# Patient Record
Sex: MALE | Race: BLACK OR AFRICAN AMERICAN | Employment: FULL TIME
[De-identification: names, ages, dates, MRNs, and addresses within clinical notes are randomized per-mention and may not be internally consistent; named-entity substitution may affect disease eponyms.]

---

## 2017-01-27 ENCOUNTER — SURGERY (OUTPATIENT)
Age: 52
End: 2017-01-27

## 2017-01-27 ENCOUNTER — ANESTHESIA EVENT (OUTPATIENT)
Dept: ENDOSCOPY | Age: 52
End: 2017-01-27
Payer: COMMERCIAL

## 2017-01-27 ENCOUNTER — ANESTHESIA (OUTPATIENT)
Dept: ENDOSCOPY | Age: 52
End: 2017-01-27
Payer: COMMERCIAL

## 2017-01-27 PROCEDURE — 74011250636 HC RX REV CODE- 250/636

## 2017-01-27 RX ORDER — PROPOFOL 10 MG/ML
INJECTION, EMULSION INTRAVENOUS AS NEEDED
Status: DISCONTINUED | OUTPATIENT
Start: 2017-01-27 | End: 2017-01-27 | Stop reason: HOSPADM

## 2017-01-27 RX ORDER — PROPOFOL 10 MG/ML
INJECTION, EMULSION INTRAVENOUS
Status: DISCONTINUED | OUTPATIENT
Start: 2017-01-27 | End: 2017-01-27 | Stop reason: HOSPADM

## 2017-01-27 RX ADMIN — PROPOFOL 50 MG: 10 INJECTION, EMULSION INTRAVENOUS at 09:58

## 2017-01-27 RX ADMIN — PROPOFOL 160 MCG/KG/MIN: 10 INJECTION, EMULSION INTRAVENOUS at 09:58

## 2017-01-27 NOTE — ANESTHESIA POSTPROCEDURE EVALUATION
Post-Anesthesia Evaluation and Assessment    Patient: Pia Vera MRN: [id number]  SSN: [id number]    YOB: 1965  Age: 49 y.o. Sex: male       Cardiovascular Function/Vital Signs  Visit Vitals    /87    Pulse (!) 51    Temp 36.1 °C (96.9 °F)    Resp 14    SpO2 99%       Patient is status post total IV anesthesia anesthesia for Procedure(s):  COLONOSCOPY/ 24.    Nausea/Vomiting: None    Postoperative hydration reviewed and adequate. Pain:  Pain Scale 1: Numeric (0 - 10) (01/27/17 0942)  Pain Intensity 1: 0 (01/27/17 0942)   Managed    Neurological Status: At baseline    Mental Status and Level of Consciousness: Arousable    Pulmonary Status:   O2 Device: Nasal cannula (01/27/17 1035)   Adequate oxygenation and airway patent    Complications related to anesthesia: None    Post-anesthesia assessment completed.  No concerns    Signed By: Evaristo Vale MD     January 27, 2017

## 2017-01-27 NOTE — ANESTHESIA PREPROCEDURE EVALUATION
Anesthetic History   No history of anesthetic complications            Review of Systems / Medical History  Patient summary reviewed, nursing notes reviewed and pertinent labs reviewed    Pulmonary  Within defined limits                 Neuro/Psych   Within defined limits           Cardiovascular    Hypertension: well controlled              Exercise tolerance: >4 METS     GI/Hepatic/Renal  Within defined limits              Endo/Other  Within defined limits           Other Findings              Physical Exam    Airway  Mallampati: II  TM Distance: > 6 cm  Neck ROM: normal range of motion   Mouth opening: Normal     Cardiovascular    Rhythm: regular  Rate: normal         Dental  No notable dental hx       Pulmonary  Breath sounds clear to auscultation               Abdominal         Other Findings            Anesthetic Plan    ASA: 2  Anesthesia type: total IV anesthesia            Anesthetic plan and risks discussed with: Patient

## 2018-04-12 ENCOUNTER — HOSPITAL ENCOUNTER (EMERGENCY)
Age: 53
Discharge: HOME | End: 2018-04-12
Attending: EMERGENCY MEDICINE
Payer: COMMERCIAL

## 2018-04-12 VITALS
BODY MASS INDEX: 25.77 KG/M2 | HEIGHT: 70 IN | HEART RATE: 82 BPM | OXYGEN SATURATION: 97 % | SYSTOLIC BLOOD PRESSURE: 159 MMHG | TEMPERATURE: 98.9 F | WEIGHT: 180 LBS | RESPIRATION RATE: 18 BRPM | DIASTOLIC BLOOD PRESSURE: 90 MMHG

## 2018-04-12 DIAGNOSIS — J30.89 SEASONAL ALLERGIC RHINITIS DUE TO OTHER ALLERGIC TRIGGER: ICD-10-CM

## 2018-04-12 DIAGNOSIS — R06.2 WHEEZING: Primary | ICD-10-CM

## 2018-04-12 PROCEDURE — 99283 EMERGENCY DEPT VISIT LOW MDM: CPT | Performed by: EMERGENCY MEDICINE

## 2018-04-12 PROCEDURE — 74011636637 HC RX REV CODE- 636/637: Performed by: EMERGENCY MEDICINE

## 2018-04-12 PROCEDURE — 94640 AIRWAY INHALATION TREATMENT: CPT

## 2018-04-12 PROCEDURE — 74011000250 HC RX REV CODE- 250: Performed by: EMERGENCY MEDICINE

## 2018-04-12 RX ORDER — IPRATROPIUM BROMIDE AND ALBUTEROL SULFATE 2.5; .5 MG/3ML; MG/3ML
3 SOLUTION RESPIRATORY (INHALATION)
Status: COMPLETED | OUTPATIENT
Start: 2018-04-12 | End: 2018-04-12

## 2018-04-12 RX ORDER — LORATADINE 10 MG/1
10 TABLET ORAL DAILY
Qty: 30 TAB | Refills: 0 | Status: SHIPPED | OUTPATIENT
Start: 2018-04-12 | End: 2018-05-12

## 2018-04-12 RX ORDER — ALBUTEROL SULFATE 90 UG/1
2 AEROSOL, METERED RESPIRATORY (INHALATION)
Qty: 1 INHALER | Refills: 0 | Status: SHIPPED | OUTPATIENT
Start: 2018-04-12 | End: 2020-04-01 | Stop reason: SDUPTHER

## 2018-04-12 RX ADMIN — IPRATROPIUM BROMIDE AND ALBUTEROL SULFATE 3 ML: .5; 3 SOLUTION RESPIRATORY (INHALATION) at 01:10

## 2018-04-12 RX ADMIN — PREDNISONE 60 MG: 10 TABLET ORAL at 00:59

## 2018-04-12 NOTE — DISCHARGE INSTRUCTIONS
Allergies: Care Instructions  Your Care Instructions    Allergies occur when your body's defense system (immune system) overreacts to certain substances. The immune system treats a harmless substance as if it were a harmful germ or virus. Many things can cause this overreaction, including pollens, medicine, food, dust, animal dander, and mold. Allergies can be mild or severe. Mild allergies can be managed with home treatment. But medicine may be needed to prevent problems. Managing your allergies is an important part of staying healthy. Your doctor may suggest that you have allergy testing to help find out what is causing your allergies. When you know what things trigger your symptoms, you can avoid them. This can prevent allergy symptoms and other health problems. For severe allergies that cause reactions that affect your whole body (anaphylactic reactions), your doctor may prescribe a shot of epinephrine to carry with you in case you have a severe reaction. Learn how to give yourself the shot and keep it with you at all times. Make sure it is not . Follow-up care is a key part of your treatment and safety. Be sure to make and go to all appointments, and call your doctor if you are having problems. It's also a good idea to know your test results and keep a list of the medicines you take. How can you care for yourself at home? · If you have been told by your doctor that dust or dust mites are causing your allergy, decrease the dust around your bed:  [facility] sheets, pillowcases, and other bedding in hot water every week. ¨ Use dust-proof covers for pillows, duvets, and mattresses. Avoid plastic covers because they tear easily and do not \"breathe. \" Wash as instructed on the label. ¨ Do not use any blankets and pillows that you do not need. ¨ Use blankets that you can wash in your washing machine. ¨ Consider removing drapes and carpets, which attract and hold dust, from your bedroom.   · If you are allergic to house dust and mites, do not use home humidifiers. Your doctor can suggest ways you can control dust and mites. · Look for signs of cockroaches. Cockroaches cause allergic reactions. Use cockroach baits to get rid of them. Then, clean your home well. Cockroaches like areas where grocery bags, newspapers, empty bottles, or cardboard boxes are stored. Do not keep these inside your home, and keep trash and food containers sealed. Seal off any spots where cockroaches might enter your home. · If you are allergic to mold, get rid of furniture, rugs, and drapes that smell musty. Check for mold in the bathroom. · If you are allergic to outdoor pollen or mold spores, use air-conditioning. Change or clean all filters every month. Keep windows closed. · If you are allergic to pollen, stay inside when pollen counts are high. Use a vacuum  with a HEPA filter or a double-thickness filter at least two times each week. · Stay inside when air pollution is bad. Avoid paint fumes, perfumes, and other strong odors. · Avoid conditions that make your allergies worse. Stay away from smoke. Do not smoke or let anyone else smoke in your house. Do not use fireplaces or wood-burning stoves. · If you are allergic to your pets, change the air filter in your furnace every month. Use high-efficiency filters. · If you are allergic to pet dander, keep pets outside or out of your bedroom. Old carpet and cloth furniture can hold a lot of animal dander. You may need to replace them. When should you call for help? Give an epinephrine shot if:  ? · You think you are having a severe allergic reaction. ? · You have symptoms in more than one body area, such as mild nausea and an itchy mouth. ? After giving an epinephrine shot call 911, even if you feel better. ?Call 911 if:  ? · You have symptoms of a severe allergic reaction. These may include:  ¨ Sudden raised, red areas (hives) all over your body.   ¨ Swelling of the throat, mouth, lips, or tongue. ¨ Trouble breathing. ¨ Passing out (losing consciousness). Or you may feel very lightheaded or suddenly feel weak, confused, or restless. ? · You have been given an epinephrine shot, even if you feel better. ?Call your doctor now or seek immediate medical care if:  ? · You have symptoms of an allergic reaction, such as:  ¨ A rash or hives (raised, red areas on the skin). ¨ Itching. ¨ Swelling. ¨ Belly pain, nausea, or vomiting. ? Watch closely for changes in your health, and be sure to contact your doctor if:  ? · You do not get better as expected. Where can you learn more? Go to http://christian-ray.info/. Enter L866 in the search box to learn more about \"Allergies: Care Instructions. \"  Current as of: September 29, 2016  Content Version: 11.4  © 2824-9455 [employer]. Care instructions adapted under license by [employer] (which disclaims liability or warranty for this information). If you have questions about a medical condition or this instruction, always ask your healthcare professional. [place] [address] any warranty or liability for your use of this information. Wheezing or Bronchoconstriction: Care Instructions  Your Care Instructions  Wheezing is a whistling noise made during breathing. It occurs when the small airways, or bronchial tubes, that lead to your lungs swell or contract (spasm) and become narrow. This narrowing is called bronchoconstriction. When your airways constrict, it is hard for air to pass through and this makes it hard for you to breathe. Wheezing and bronchoconstriction can be caused by many problems, including:  · An infection such as the flu or a cold. · Allergies such as hay fever. · Diseases such as asthma or chronic obstructive pulmonary disease. · Smoking. Treatment for your wheezing depends on what is causing the problem.  Your wheezing may get better without treatment. But you may need to pay attention to things that cause your wheezing and avoid them. Or you may need medicine to help treat the wheezing and to reduce the swelling or to relieve spasms in your lungs. Follow-up care is a key part of your treatment and safety. Be sure to make and go to all appointments, and call your doctor if you are having problems. It is also a good idea to know your test results and keep a list of the medicines you take. How can you care for yourself at home? · Take your medicine exactly as prescribed. Call your doctor if you think you are having a problem with your medicine. You will get more details on the specific medicine your doctor prescribes. · If your doctor prescribed antibiotics, take them as directed. Do not stop taking them just because you feel better. You need to take the full course of antibiotics. · Breathe moist air from a humidifier, hot shower, or sink filled with hot water. This may help ease your symptoms and make it easier for you to breathe. · If you have congestion in your nose and throat, drinking plenty of fluids, especially hot fluids, may help relieve your symptoms. If you have kidney, heart, or liver disease and have to limit fluids, talk with your doctor before you increase the amount of fluids you drink. · If you have mucus in your airways, it may help to breathe deeply and cough. · Do not smoke or allow others to smoke around you. Smoking can make your wheezing worse. If you need help quitting, talk to your doctor about stop-smoking programs and medicines. These can increase your chances of quitting for good. · Avoid things that may cause your wheezing. These may include colds, smoke, air pollution, dust, pollen, pets, cockroaches, stress, and cold air. When should you call for help? Call 911 anytime you think you may need emergency care. For example, call if:  ? · You have severe trouble breathing. ? · You passed out (lost consciousness). ?Call your doctor now or seek immediate medical care if:  ? · You cough up yellow, dark brown, or bloody mucus (sputum). ? · You have new or worse shortness of breath. ? · Your wheezing is not getting better or it gets worse after you start taking your medicine. ? Watch closely for changes in your health, and be sure to contact your doctor if:  ? · You do not get better as expected. Where can you learn more? Go to http://christian-ray.info/. Enter 454 0876 in the search box to learn more about \"Wheezing or Bronchoconstriction: Care Instructions. \"  Current as of: May 12, 2017  Content Version: 11.4  © 3050-2058 [employer]. Care instructions adapted under license by [employer] (which disclaims liability or warranty for this information). If you have questions about a medical condition or this instruction, always ask your healthcare professional. [address] any warranty or liability for your use of this information.

## 2018-04-12 NOTE — ED PROVIDER NOTES
HPI Comments: Patient presents to the ER complaining of wheezing and cough. Patient states symptoms started early this evening. Does report some rhinorrhea and nasal congestion. Denies any fevers or vomiting    Patient is a 47 y.o. male presenting with wheezing. The history is provided by the patient. Wheezing    This is a new problem. The current episode started 3 to 5 hours ago. The problem occurs constantly. The problem has not changed since onset. Associated symptoms include cough. Pertinent negatives include no abdominal pain, no coryza, no ear pain, no sore throat, no swollen glands and no rash. Past Medical History:   Diagnosis Date    Environmental and seasonal allergies 3/29/2016    Essential hypertension with goal blood pressure less than 140/90 9/30/2016       Past Surgical History:   Procedure Laterality Date    COLONOSCOPY N/A 1/27/2017    COLONOSCOPY/ 24 performed by Dom [facility] at [facility] END[facility]COPY    HX CYST REMOVAL Right     wrist    HX SHOULDER ARTHROSCOPY           SD COLONOSCOPY FLX DX W/COLLJ SPEC WHEN PFRMD  1/27/2017              Family History:   Problem Relation Age of Onset    Other Mother      hemorrhage    Other Father      alcoholism    Alcohol abuse Father     Colon Cancer Neg Hx        Social History     Social History    Marital status: SINGLE     Spouse name: N/A    Number of children: N/A    Years of education: N/A     Occupational History    Not on file. Social History Main Topics    Smoking status: Never Smoker    Smokeless tobacco: Never Used    Alcohol use 0.0 oz/week     0 Standard drinks or equivalent per week      Comment: social    Drug use: No    Sexual activity: Not on file     Other Topics Concern    Not on file     Social History Narrative    He is a  for his shift making auto parts for [employer]         ALLERGIES: Review of patient's allergies indicates no known allergies.     Review of Systems   Constitutional: Negative for diaphoresis and fatigue. HENT: Positive for congestion. Negative for ear pain and sore throat. Eyes: Negative for photophobia, redness and visual disturbance. Respiratory: Positive for cough and wheezing. Negative for choking and chest tightness. Cardiovascular: Negative for palpitations and leg swelling. Gastrointestinal: Negative for abdominal pain and anal bleeding. Endocrine: Negative for polydipsia and polyphagia. Genitourinary: Negative for flank pain and frequency. Musculoskeletal: Negative for back pain and gait problem. Skin: Negative for pallor and rash. Allergic/Immunologic: Negative for food allergies and immunocompromised state. Neurological: Negative for light-headedness and numbness. Hematological: Negative for adenopathy. Does not bruise/bleed easily. All other systems reviewed and are negative. Vitals:    04/12/18 0044   BP: (!) 164/98   Pulse: 97   Resp: 18   Temp: 98.9 °F (37.2 °C)   SpO2: 96%   Weight: 81.6 kg (180 lb)   Height: 5' 10\" (1.778 m)            Physical Exam   Constitutional: He appears well-developed and well-nourished. HENT:   Head: Normocephalic and atraumatic. Eyes: Conjunctivae and EOM are normal. Pupils are equal, round, and reactive to light. Cardiovascular: Normal rate, regular rhythm and intact distal pulses. Pulmonary/Chest: Effort normal. He has wheezes. Abdominal: Soft. Bowel sounds are normal.   Neurological: He is alert. He has normal reflexes. Nursing note and vitals reviewed. MDM  Number of Diagnoses or Management Options  Seasonal allergic rhinitis due to other allergic trigger:   Wheezing:   Diagnosis management comments: Patient with some minimal wheezing here. We'll treat with nebulizers    1:31 AM  Patient is clear on reassessment.   We'll discharge home    Risk of Complications, Morbidity, and/or Mortality  Presenting problems: low  Diagnostic procedures: low  Management options: low    Patient Progress  Patient progress: stable        ED Course       Procedures

## 2020-12-23 ENCOUNTER — HOSPITAL ENCOUNTER (OUTPATIENT)
Dept: LAB | Age: 55
Discharge: HOME | End: 2020-12-23

## 2020-12-23 PROCEDURE — 88305 TISSUE EXAM BY PATHOLOGIST: CPT

## 2021-02-01 ENCOUNTER — HOSPITAL ENCOUNTER (OUTPATIENT)
Dept: PHYSICAL THERAPY | Age: 56
Discharge: HOME | End: 2021-02-01
Attending: UROLOGY
Payer: COMMERCIAL

## 2021-02-01 ENCOUNTER — HOSPITAL ENCOUNTER (OUTPATIENT)
Dept: GENERAL RADIOLOGY | Age: 56
Discharge: HOME | End: 2021-02-01
Attending: UROLOGY
Payer: COMMERCIAL

## 2021-02-01 ENCOUNTER — HOSPITAL ENCOUNTER (OUTPATIENT)
Dept: SURGERY | Age: 56
Discharge: HOME | End: 2021-02-01
Payer: COMMERCIAL

## 2021-02-01 VITALS
OXYGEN SATURATION: 98 % | DIASTOLIC BLOOD PRESSURE: 86 MMHG | RESPIRATION RATE: 16 BRPM | WEIGHT: 166.8 LBS | BODY MASS INDEX: 23.88 KG/M2 | TEMPERATURE: 97.1 F | SYSTOLIC BLOOD PRESSURE: 158 MMHG | HEART RATE: 46 BPM | HEIGHT: 70 IN

## 2021-02-01 DIAGNOSIS — C61 MALIGNANT NEOPLASM OF PROSTATE (HCC): ICD-10-CM

## 2021-02-01 LAB
ALBUMIN SERPL-MCNC: 4.3 G/DL (ref 3.5–5)
ALBUMIN/GLOB SERPL: 1.1 {RATIO} (ref 1.2–3.5)
ALP SERPL-CCNC: 70 U/L (ref 50–136)
ALT SERPL-CCNC: 38 U/L (ref 12–65)
ANION GAP SERPL CALC-SCNC: 2 MMOL/L (ref 7–16)
APPEARANCE UR: CLEAR
AST SERPL-CCNC: 20 U/L (ref 15–37)
BILIRUB SERPL-MCNC: 0.7 MG/DL (ref 0.2–1.1)
BILIRUB UR QL: NEGATIVE
BUN SERPL-MCNC: 12 MG/DL (ref 6–23)
CALCIUM SERPL-MCNC: 9.1 MG/DL (ref 8.3–10.4)
CHLORIDE SERPL-SCNC: 106 MMOL/L (ref 98–107)
CO2 SERPL-SCNC: 32 MMOL/L (ref 21–32)
COLOR UR: YELLOW
CREAT SERPL-MCNC: 1.09 MG/DL (ref 0.8–1.5)
ERYTHROCYTE [DISTWIDTH] IN BLOOD BY AUTOMATED COUNT: 12.5 % (ref 11.9–14.6)
GLOBULIN SER CALC-MCNC: 4 G/DL (ref 2.3–3.5)
GLUCOSE SERPL-MCNC: 83 MG/DL (ref 65–100)
GLUCOSE UR STRIP.AUTO-MCNC: NEGATIVE MG/DL
HCT VFR BLD AUTO: 47.9 % (ref 41.1–50.3)
HGB BLD-MCNC: 16.1 G/DL (ref 13.6–17.2)
HGB UR QL STRIP: NEGATIVE
INR PPP: 1
KETONES UR QL STRIP.AUTO: NEGATIVE MG/DL
LEUKOCYTE ESTERASE UR QL STRIP.AUTO: NEGATIVE
MCH RBC QN AUTO: 31.8 PG (ref 26.1–32.9)
MCHC RBC AUTO-ENTMCNC: 33.6 G/DL (ref 31.4–35)
MCV RBC AUTO: 94.7 FL (ref 79.6–97.8)
NITRITE UR QL STRIP.AUTO: NEGATIVE
NRBC # BLD: 0 K/UL (ref 0–0.2)
PH UR STRIP: 6 [PH] (ref 5–9)
PLATELET # BLD AUTO: 201 K/UL (ref 150–450)
PMV BLD AUTO: 11.7 FL (ref 9.4–12.3)
POTASSIUM SERPL-SCNC: 3.4 MMOL/L (ref 3.5–5.1)
PROT SERPL-MCNC: 8.3 G/DL (ref 6.3–8.2)
PROT UR STRIP-MCNC: NEGATIVE MG/DL
PROTHROMBIN TIME: 13.5 SEC (ref 12.5–14.7)
RBC # BLD AUTO: 5.06 M/UL (ref 4.23–5.6)
SODIUM SERPL-SCNC: 140 MMOL/L (ref 138–145)
SP GR UR REFRACTOMETRY: 1.02 (ref 1–1.02)
UROBILINOGEN UR QL STRIP.AUTO: 1 EU/DL (ref 0.2–1)
WBC # BLD AUTO: 6.1 K/UL (ref 4.3–11.1)

## 2021-02-01 PROCEDURE — 87086 URINE CULTURE/COLONY COUNT: CPT

## 2021-02-01 PROCEDURE — 93005 ELECTROCARDIOGRAM TRACING: CPT | Performed by: UROLOGY

## 2021-02-01 PROCEDURE — 85610 PROTHROMBIN TIME: CPT

## 2021-02-01 PROCEDURE — 71046 X-RAY EXAM CHEST 2 VIEWS: CPT

## 2021-02-01 PROCEDURE — 97161 PT EVAL LOW COMPLEX 20 MIN: CPT

## 2021-02-01 PROCEDURE — 81003 URINALYSIS AUTO W/O SCOPE: CPT

## 2021-02-01 PROCEDURE — 85027 COMPLETE CBC AUTOMATED: CPT

## 2021-02-01 PROCEDURE — 97110 THERAPEUTIC EXERCISES: CPT

## 2021-02-01 PROCEDURE — 80053 COMPREHEN METABOLIC PANEL: CPT

## 2021-02-01 RX ORDER — FLUTICASONE PROPIONATE 50 MCG
2 SPRAY, SUSPENSION (ML) NASAL AS NEEDED
COMMUNITY
End: 2021-06-03

## 2021-02-01 NOTE — PROGRESS NOTES
Joelle Johnson  : 1965  Primary: [facility]  Secondary:  Therapy Center at [address] [place] [address], [address] [place] [address]  [phone]   [phone]         OUTPATIENT PHYSICAL THERAPY: Daily Treatment Note 2021     ICD-10: Treatment Diagnosis: R27.8 Lack of coordination (muscle incoordination)  Precautions/Allergies:   Patient has no known allergies. TREATMENT PLAN:  Effective Dates: 2021 TO 2021 (90 days). Frequency/Duration: Follow up 3 weeks post-op, then resume PT 1 x weekly. Duration to be determined post-operatively. MEDICAL/REFERRING DIAGNOSIS:  Malignant neoplasm of prostate ([address] Riaz Zimmerman   DATE OF ONSET: -  REFERRING PHYSICIAN: Yodit Blevins DO MD Orders: evaluate and treat  Return MD Appointment: 21       Pre-treatment Symptoms/Complaints:  see evaluation  Pain: Initial:    Post Session:  0/10   Medications Last Reviewed:  2021  Updated Objective Findings:  See evaluation note from today   TREATMENT:   THERAPEUTIC EXERCISE: (10 minutes):  Exercises per grid below to improve mobility, strength, and coordination. Required minimal visual, verbal, and manual cues to promote proper body alignment, promote proper body posture, promote proper body mechanics, and promote proper body breathing techniques. Progressed resistance, range, repetitions, and complexity of movement as indicated. Date:  21 Date:   Date:     Activity/Exercise Parameters Parameters Parameters   Kegel Exercises Supine, seated, standing      Bladder/bowel health Bladder health, toilet positioning      HEP                                Treatment/Session Summary:    · Response to Treatment:  Pt reports good understanding of plan of care, as well as prescribed home exercise program.  All questions were answered to pt's satisfaction. Pt was invited to call with any further questions or concerns.   · Communication/Consultation:  None today  · Equipment provided today:  None today  · Recommendations/Intent for next treatment session: Next visit will focus on update subjective, biofeedback, kegel + movement.   Total Treatment Billable Duration:  10 minutes  PT Patient Time In/Time Out  Time In: 1100  Time Out: 210 Cesar Austin DPT    Future Appointments   Date Time Provider Kin Hugo   2/1/2021  1:10 PM [address]IVE THRU [facility]SA IMD IMD   2/1/2021  2:45 PM Mayank Ramirez, DO [id number] PGU   2/1/2021  3:30 PM SFD ASSESSMENT RM 04 [id number] SFD OR PRE A   2/15/2021  3:00 PM Mayank Ramirez, DO [id number] PGU   3/1/2021  9:00 AM ANNETTE LaraT [facility]   3/3/2021  9:20 AM Bhavesh Pool MD [facility]   3/8/2021  9:00 AM Billy Sales DPT [facility]   3/15/2021  9:00 AM ANNETTE LaraT [facility]   3/22/2021  9:00 AM Billy Sales DPT [facility]   3/29/2021  9:00 AM ANNETTE LaraT [facility]   4/5/2021  9:00 AM Billy Sales, ANNETTET [facility]   5/27/2021 12:00 PM [id number] BLOOD DRAW [id number] PGU   6/3/2021 11:15 AM Faby Kothari MD [id number] PGU

## 2021-02-01 NOTE — PERIOP NOTES
Patient verified name and     Order for consent  found in EHR and matches case posting; patient verified. Type 3 surgery, Walk in assessment complete. Labs per surgeon: CBC,CMP, UA, UA Cx, PA/LAT,PT,EKG.; results pending. Labs per anesthesia protocol: no additional- T/S ordered DOS. ;  EKG: EKG 21    Dr Lucho Aleman reviewed EKG with no further orders. Patient COVID test date 2021; Patient did  show for the appointment. The testing center is located at the [address], [place]. If appointment is needed patient provided telephone number of [phone]. Hospital approved surgical skin cleanser and instructions given per hospital policy. Patient provided with and instructed on educational handouts including Guide to Surgery, Pain Management, Hand Hygiene, Blood Transfusion Education, and [facility] [place] [facility] Brochure. Patient answered medical/surgical history questions at their best of ability. All prior to admission medications documented in [facility]. Original medication prescription bottle were not visualized during patient appointment. Patient instructed to hold all vitamins 7 days prior to surgery and NSAIDS 5 days prior to surgery, patient verbalized understanding. Patient teach back successful and patient demonstrates knowledge of instructions. PLEASE CONTINUE TAKING ALL PRESCRIPTION MEDICATIONS UP TO THE DAY OF SURGERY UNLESS OTHERWISE DIRECTED BELOW. DISCONTINUE all vitamins and supplements 7 days prior to surgery. DISCONTINUE Non-Steriodal Anti-Inflammatory (NSAIDS) such as Advil, Ibuprofen, Excedrin, Motrin, and Aleve 5 days prior to surgery. Home Medications to take  the day of surgery      Levocetirizine (Xyzal)           Home Medications   to Hold           Comments     Bring Proair inhaler the day of surgery. Hibiclens shower the night before surgery and the morning of surgery.      *Visitor policy of 1 visitor per patient discussed. Please do not bring home medications with you on the day of surgery unless otherwise directed by your nurse. If you are instructed to bring home medications, please give them to your nurse as they will be administered by the nursing staff. If you have any questions, please call [facility] [phone] or [address] [place] [address] [phone]. A copy of this note was provided to the patient for reference.

## 2021-02-01 NOTE — THERAPY EVALUATION
Afua Lauren : 1965 Primary: [facility]uf Cramer* Secondary:  [facility] 
[address] [place] [address] [phone]   [phone] OUTPATIENT PHYSICAL THERAPY:Initial Assessment 2021 ICD-10: Treatment Diagnosis: R27.8 Lack of coordination (muscle incoordination) Precautions/Allergies:  
Patient has no known allergies. TREATMENT PLAN: 
Effective Dates: 2021 TO 2021 (90 days). Frequency/Duration: Follow up 3 weeks post-op, then resume PT 1 x weekly. Duration to be determined post-operatively. MEDICAL/REFERRING DIAGNOSIS: 
Malignant neoplasm of prostate ([facility] [address] Yudi Stager DATE OF ONSET: - 
REFERRING PHYSICIAN: Kalen Ramirez DO 
MD Orders: evaluate and treat Return MD Appointment: 21 INITIAL ASSESSMENT:  Mr. Lashanda Pickens presents with decreased coordination, strength and endurance of pelvic floor muscle pre operatively. Today he was educated on bladder health, kegel exercises, pelvic floor anatomy and role of musculature and precautions with catheter post operatively. He required verbal cuing for proper activation and isolation of pelvic floor muscles. He was given kegel exercises to do at home prior to surgery and once catheter is removed. He was educated on pain after surgery and precautions with kegel exercises. He was able to demonstrate improved coordination by the end of the session today. He will continue to benefit from skilled physical therapy to address above mentioned deficits and restore normal PFM function post operatively to minimize urinary incontinence PROBLEM LIST (Impacting functional limitations): 1. Decreased Rice with Home Exercise Program 
2. Decreased coordination 3. Decreased strength of pelvic floor which limits bladder control INTERVENTIONS PLANNED: 
1. Neuromuscular re-education 2. Biofeedback as needed 3. HEP 4. Bladder retraining 5. Bladder education 6. Therapeutic Activites 7. Therapeutic Exercise/Strengthening GOALS: (Goals have been discussed and agreed upon with patient.) Short-Term Functional Goals: Time Frame: 4 weeks 1. Pt will demonstrate I with basic PFM HEP to improve awareness, coordination, and timing of PFM. 2. Pt will increase water intake by 16 oz and decrease irritant consumption by 8 oz to improve bladder health and decrease UI. Discharge Goals: Time Frame: 8 weeks 1. Pt will demonstrate 10 quick flicks of the pelvic floor muscle group, without compensation, to implement urge suppression appropriately with urgency of urination and decrease the number of pads used per day. 2. Pt will increase PFM endurance to 10 seconds without compensation to improve standing tolerance without UI to > 1 hour. 3. Pt will demonstrate I with advanced core stabilization and general mobility program to facilitate carry over and I management of symptoms. Outcome Measure: Tool used: Expanded Prostate Cancer Index Composite for Clinical Practice (EPIC-CP) Score:  Initial: ? Urinary Incontinence symptom score: 0/12 
? Urinary Irritation/Obstruction symptoms score: 0/12 ? Bowel symptoms score: 0/12 
? Sexual symptoms score: 0/12 
? Vitality/hormonal symptoms score: 0/12 
? Overall Prostate Cancer QOL score: 0/60 Most Recent: X (Date: -- ) Interpretation of Score: This survey asks questions concerning certain urinary, bowel and sexual symptoms. Each question is scored on a 0-4 scale, 4 representing the greatest disability. There are 5 sub-scores, each out of 12 and a total overall symptom score out of 60. The higher the score, suggests a higher disability. Medical Necessity:  
· Patient is expected to demonstrate progress in strength, coordination and functional technique to minimize UI following prostatectomy. Reason for Services/Other Comments: 
· Patient continues to require skilled intervention due to above mentioned deficits. Total Duration: PT Patient Time In/Time Out Time In: 1100 Time Out: 1200 Rehabilitation Potential For Stated Goals: Good Regarding Gabbie Hodgkins therapy, I certify that the treatment plan above will be carried out by a therapist or under their direction. Thank you for this referral, 
Ari Higuera DPT Referring Physician Signature: Abdirashid Rosenbaum DO              Date Ambulatory/Rehab Services H2 Model Falls Risk Assessment Risk Factors: 
     (1)  Gender [Male] Ability to Rise from Chair: 
     (0)  Ability to rise in a single movement Falls Prevention Plan: No modifications necessary Total: (5 or greater = High Risk): 1 ©2010 [facility] [place]. [place] States Patent #[id number]. Federal Law prohibits the replication, distribution or use without written permission from [facility] HISTORY:  
History of Present Injury/Illness (Reason for Referral): 
Pt is a 47 yo male referred to pelvic floor physical therapy (PFPT) 2/2 prostate cancer by Abdirashid Rosenbaum DO. Findings were found in routine screening. He will be undergoing RALP on 2-8-21. Patient reports currently no symptoms. Exercise cycling. Urinary: Frequency 5-8 x/day, 0 x/night. Positive for none. Negative for stress urinary incontinence, urge urinary incontinence, urinary urgency, urinary frequency, incomplete emptying, urinary hesitancy/intermittency, dysuria, hematuria, nocturia and nocturnal enuresis. Pt does not use pads for protection; 0 pads per day (PPD). Fluid intake: 1 bottle  water/day; bladder irritants include: coffee (2 cups), Bowel: Frequency twice a day. Positive for none. Negative for pain with bowel movement, pushing/straining with bowel movement, fecal incontinence, constipation and incomplete emptying. Pt does not use pads for protection; 0 pads per day (PPD). Use of stool softeners or laxatives?  NO 
 Sexual: Pt is sexually active with female partners. negative for dyspareunia. History of sexual abuse:  NO:  
       Pelvic Pain: none Past Medical History/Comorbidities:  
Mr. Leidy Gotti  has a past medical history of Environmental and seasonal allergies (3/29/2016) and Essential hypertension with goal blood pressure less than 140/90 (9/30/2016). He also has no past medical history of Adverse effect of anesthesia, Difficult intubation, Malignant hyperthermia due to anesthesia, Nausea & vomiting, or Pseudocholinesterase deficiency. Mr. Leidy Gotti  has a past surgical history that includes hx cyst removal (Right); hx shoulder arthroscopy; pr colonoscopy flx dx w/collj spec when pfrmd (1/27/2017); and colonoscopy (N/A, 1/27/2017). Social History/Living Environment:  
  live alone · Social activities restricted due to urinary incontinence? NO:  
Prior Level of Function/Work/Activity: 
Work- assembly tech High school  Personal Factors:   
      Sex:  male Age:  48 y.o. Current Medications:   
Current Outpatient Medications:  
  levocetirizine (XYZAL) 5 mg tablet, TAKE 1 TABLET BY MOUTH ONCE DAILY, Disp: , Rfl:  
  losartan (COZAAR) 50 mg tablet, TAKE 1 TABLET BY MOUTH EVERY DAY, Disp: 90 Tab, Rfl: 0 
  albuterol (PROVENTIL HFA, VENTOLIN HFA, PROAIR HFA) 90 mcg/actuation inhaler, Take 1 Puff by inhalation every six (6) hours as needed for Wheezing or Shortness of Breath., Disp: 1 Inhaler, Rfl: 0 
  fluticasone propionate (FLONASE) 50 mcg/actuation nasal spray, 1 Kohler by Both Nostrils route daily. PRN allergy symptoms, Disp: 1 Bottle, Rfl: 0 Date Last Reviewed:  2/1/2021 Number of Personal Factors/Comorbidities that affect the Plan of Care: 0: LOW COMPLEXITY EXAMINATION:  
External Observation: · Voluntary Contraction: present · Voluntary Relaxation: present Pelvic Floor Muscle (PFM) Assessment: external palpation ? Muscle volume: [] decreased     [x] WNL     [] Defect ? PFM tension: [] decreased     [] increased     [x] WNL Contraction ability (MMT performed via external palpation): 
Voluntary contraction: [] absent     [] weak     [x] moderate      [] strong Voluntary relaxation: [] absent     [] partial     [x] complete Overflow: [] absent     [x] min     [] mod     [] severe / Compensatory mm groups include abdominals Body Structures Involved: 1. Muscles Body Functions Affected: 1. Genitourinary 2. Neuromusculoskeletal 
3. Movement Related Activities and Participation Affected: 1. Self Care 2. Interpersonal Interactions and Relationships 3. Community, Social and [place] Number of elements that affect the Plan of Care: 1-2: LOW COMPLEXITY CLINICAL PRESENTATION:  
Presentation: Stable and uncomplicated: LOW COMPLEXITY CLINICAL DECISION MAKING:  
  
Use of outcome tool(s) and clinical judgement create a POC that gives a: Clear prediction of patient's progress: LOW COMPLEXITY

## 2021-02-02 LAB
ATRIAL RATE: 45 BPM
CALCULATED P AXIS, ECG09: 81 DEGREES
CALCULATED R AXIS, ECG10: 39 DEGREES
CALCULATED T AXIS, ECG11: 60 DEGREES
DIAGNOSIS, 93000: NORMAL
P-R INTERVAL, ECG05: 196 MS
Q-T INTERVAL, ECG07: 446 MS
QRS DURATION, ECG06: 78 MS
QTC CALCULATION (BEZET), ECG08: 385 MS
VENTRICULAR RATE, ECG03: 45 BPM

## 2021-02-02 NOTE — PERIOP NOTES
All labs reviewed and WNL. UA WNL. UA culture with no growth at this time. CXR WNL. Chart flagged to continue to monitor UA culture.

## 2021-02-04 LAB
BACTERIA SPEC CULT: NORMAL
SERVICE CMNT-IMP: NORMAL

## 2021-02-07 ENCOUNTER — ANESTHESIA EVENT (OUTPATIENT)
Dept: SURGERY | Age: 56
DRG: 708 | End: 2021-02-07
Payer: COMMERCIAL

## 2021-02-08 ENCOUNTER — ANESTHESIA (OUTPATIENT)
Dept: SURGERY | Age: 56
DRG: 708 | End: 2021-02-08
Payer: COMMERCIAL

## 2021-02-08 ENCOUNTER — HOSPITAL ENCOUNTER (INPATIENT)
Age: 56
LOS: 1 days | Discharge: HOME | DRG: 708 | End: 2021-02-09
Attending: UROLOGY | Admitting: UROLOGY
Payer: COMMERCIAL

## 2021-02-08 DIAGNOSIS — Z90.79 S/P PROSTATECTOMY: Primary | ICD-10-CM

## 2021-02-08 PROBLEM — C61 PROSTATE CANCER (HCC): Status: ACTIVE | Noted: 2021-02-08

## 2021-02-08 LAB
ABO + RH BLD: NORMAL
BLOOD GROUP ANTIBODIES SERPL: NORMAL
HCT VFR BLD AUTO: 43.1 % (ref 41.1–50.3)
HGB BLD-MCNC: 15 G/DL (ref 13.6–17.2)
SPECIMEN EXP DATE BLD: NORMAL

## 2021-02-08 PROCEDURE — 77030000038 HC TIP SCIS LAPSCP SURI -B: Performed by: UROLOGY

## 2021-02-08 PROCEDURE — 77030010507 HC ADH SKN DERMBND J&J -B: Performed by: UROLOGY

## 2021-02-08 PROCEDURE — 77030008606 HC TRCR ENDOSC KII AMR -B: Performed by: UROLOGY

## 2021-02-08 PROCEDURE — 74011000250 HC RX REV CODE- 250: Performed by: UROLOGY

## 2021-02-08 PROCEDURE — 77030008756 HC TU IRR SUC STRY -B: Performed by: UROLOGY

## 2021-02-08 PROCEDURE — 77030039425 HC BLD LARYNG TRULITE DISP TELE -A: Performed by: ANESTHESIOLOGY

## 2021-02-08 PROCEDURE — 77030007955 HC PCH ENDOSC SPEC J&J -B: Performed by: UROLOGY

## 2021-02-08 PROCEDURE — 76010000878 HC OR TIME 3 TO 3.5HR INTENSV - TIER 2: Performed by: UROLOGY

## 2021-02-08 PROCEDURE — 77030026438 HC STYL ET INTUB CARD -A: Performed by: ANESTHESIOLOGY

## 2021-02-08 PROCEDURE — 77030040831 HC BAG URINE DRNG MDII -A: Performed by: UROLOGY

## 2021-02-08 PROCEDURE — 74011250637 HC RX REV CODE- 250/637: Performed by: ANESTHESIOLOGY

## 2021-02-08 PROCEDURE — 88307 TISSUE EXAM BY PATHOLOGIST: CPT

## 2021-02-08 PROCEDURE — 65270000029 HC RM PRIVATE

## 2021-02-08 PROCEDURE — 85018 HEMOGLOBIN: CPT

## 2021-02-08 PROCEDURE — 77030008522 HC TBNG INSUF LAPRO STRY -B: Performed by: UROLOGY

## 2021-02-08 PROCEDURE — 76060000037 HC ANESTHESIA 3 TO 3.5 HR: Performed by: UROLOGY

## 2021-02-08 PROCEDURE — 77030008603 HC TRCR ENDOSC EPATH J&J -C: Performed by: UROLOGY

## 2021-02-08 PROCEDURE — 77030016151 HC PROTCTR LNS DFOG COVD -B: Performed by: UROLOGY

## 2021-02-08 PROCEDURE — 77030008771 HC TU NG SALEM SUMP -A: Performed by: ANESTHESIOLOGY

## 2021-02-08 PROCEDURE — 77030020703 HC SEAL CANN DISP INTU -B: Performed by: UROLOGY

## 2021-02-08 PROCEDURE — 2709999900 HC NON-CHARGEABLE SUPPLY

## 2021-02-08 PROCEDURE — 74011250636 HC RX REV CODE- 250/636: Performed by: ANESTHESIOLOGY

## 2021-02-08 PROCEDURE — 77030003575 HC NDL INSUF ENDOPTH J&J -B: Performed by: UROLOGY

## 2021-02-08 PROCEDURE — 0VT34ZZ RESECTION OF BILATERAL SEMINAL VESICLES, PERCUTANEOUS ENDOSCOPIC APPROACH: ICD-10-PCS | Performed by: UROLOGY

## 2021-02-08 PROCEDURE — 77030034696 HC CATH URETH FOL 2W BARD -A: Performed by: UROLOGY

## 2021-02-08 PROCEDURE — 2709999900 HC NON-CHARGEABLE SUPPLY: Performed by: UROLOGY

## 2021-02-08 PROCEDURE — 77030031139 HC SUT VCRL2 J&J -A: Performed by: UROLOGY

## 2021-02-08 PROCEDURE — 0VT04ZZ RESECTION OF PROSTATE, PERCUTANEOUS ENDOSCOPIC APPROACH: ICD-10-PCS | Performed by: UROLOGY

## 2021-02-08 PROCEDURE — 36415 COLL VENOUS BLD VENIPUNCTURE: CPT

## 2021-02-08 PROCEDURE — 77030012411 HC DRN WND CARD -A: Performed by: UROLOGY

## 2021-02-08 PROCEDURE — 8E0W4CZ ROBOTIC ASSISTED PROCEDURE OF TRUNK REGION, PERCUTANEOUS ENDOSCOPIC APPROACH: ICD-10-PCS | Performed by: UROLOGY

## 2021-02-08 PROCEDURE — 76210000006 HC OR PH I REC 0.5 TO 1 HR: Performed by: UROLOGY

## 2021-02-08 PROCEDURE — 77030040922 HC BLNKT HYPOTHRM STRY -A: Performed by: ANESTHESIOLOGY

## 2021-02-08 PROCEDURE — 77030002933 HC SUT MCRYL J&J -A: Performed by: UROLOGY

## 2021-02-08 PROCEDURE — 74011250637 HC RX REV CODE- 250/637: Performed by: UROLOGY

## 2021-02-08 PROCEDURE — 74011000258 HC RX REV CODE- 258: Performed by: UROLOGY

## 2021-02-08 PROCEDURE — 74011250636 HC RX REV CODE- 250/636: Performed by: NURSE ANESTHETIST, CERTIFIED REGISTERED

## 2021-02-08 PROCEDURE — 77030019908 HC STETH ESOPH SIMS -A: Performed by: ANESTHESIOLOGY

## 2021-02-08 PROCEDURE — 77030037171 HC CLP LAPRO ABS SGL COVD -B: Performed by: UROLOGY

## 2021-02-08 PROCEDURE — 74011250636 HC RX REV CODE- 250/636: Performed by: UROLOGY

## 2021-02-08 PROCEDURE — 77030035277 HC OBTRTR BLDELSS DISP INTU -B: Performed by: UROLOGY

## 2021-02-08 PROCEDURE — 77030040361 HC SLV COMPR DVT MDII -B: Performed by: UROLOGY

## 2021-02-08 PROCEDURE — 86901 BLOOD TYPING SEROLOGIC RH(D): CPT

## 2021-02-08 PROCEDURE — 74011000250 HC RX REV CODE- 250: Performed by: NURSE ANESTHETIST, CERTIFIED REGISTERED

## 2021-02-08 PROCEDURE — 0VTQ4ZZ RESECTION OF BILATERAL VAS DEFERENS, PERCUTANEOUS ENDOSCOPIC APPROACH: ICD-10-PCS | Performed by: UROLOGY

## 2021-02-08 PROCEDURE — 77030037088 HC TUBE ENDOTRACH ORAL NSL COVD-A: Performed by: ANESTHESIOLOGY

## 2021-02-08 PROCEDURE — 77030013567 HC DRN WND RESERV BARD -A: Performed by: UROLOGY

## 2021-02-08 PROCEDURE — 77030002916 HC SUT ETHLN J&J -A: Performed by: UROLOGY

## 2021-02-08 RX ORDER — OXYCODONE AND ACETAMINOPHEN 5; 325 MG/1; MG/1
1 TABLET ORAL AS NEEDED
Status: DISCONTINUED | OUTPATIENT
Start: 2021-02-08 | End: 2021-02-08 | Stop reason: HOSPADM

## 2021-02-08 RX ORDER — NEOSTIGMINE METHYLSULFATE 1 MG/ML
INJECTION, SOLUTION INTRAVENOUS AS NEEDED
Status: DISCONTINUED | OUTPATIENT
Start: 2021-02-08 | End: 2021-02-08 | Stop reason: HOSPADM

## 2021-02-08 RX ORDER — SODIUM CHLORIDE 0.9 % (FLUSH) 0.9 %
5-40 SYRINGE (ML) INJECTION EVERY 8 HOURS
Status: DISCONTINUED | OUTPATIENT
Start: 2021-02-08 | End: 2021-02-08 | Stop reason: HOSPADM

## 2021-02-08 RX ORDER — ACETAMINOPHEN 325 MG/1
650 TABLET ORAL
Status: DISCONTINUED | OUTPATIENT
Start: 2021-02-08 | End: 2021-02-09 | Stop reason: HOSPADM

## 2021-02-08 RX ORDER — NALOXONE HYDROCHLORIDE 0.4 MG/ML
0.2 INJECTION, SOLUTION INTRAMUSCULAR; INTRAVENOUS; SUBCUTANEOUS AS NEEDED
Status: DISCONTINUED | OUTPATIENT
Start: 2021-02-08 | End: 2021-02-08 | Stop reason: HOSPADM

## 2021-02-08 RX ORDER — SODIUM CHLORIDE, SODIUM LACTATE, POTASSIUM CHLORIDE, CALCIUM CHLORIDE 600; 310; 30; 20 MG/100ML; MG/100ML; MG/100ML; MG/100ML
25 INJECTION, SOLUTION INTRAVENOUS CONTINUOUS
Status: DISCONTINUED | OUTPATIENT
Start: 2021-02-08 | End: 2021-02-08 | Stop reason: HOSPADM

## 2021-02-08 RX ORDER — MIDAZOLAM HYDROCHLORIDE 1 MG/ML
INJECTION, SOLUTION INTRAMUSCULAR; INTRAVENOUS AS NEEDED
Status: DISCONTINUED | OUTPATIENT
Start: 2021-02-08 | End: 2021-02-08 | Stop reason: HOSPADM

## 2021-02-08 RX ORDER — SODIUM CHLORIDE 0.9 % (FLUSH) 0.9 %
5-40 SYRINGE (ML) INJECTION AS NEEDED
Status: DISCONTINUED | OUTPATIENT
Start: 2021-02-08 | End: 2021-02-08 | Stop reason: HOSPADM

## 2021-02-08 RX ORDER — DOCUSATE SODIUM 100 MG/1
100 CAPSULE, LIQUID FILLED ORAL 2 TIMES DAILY
Status: DISCONTINUED | OUTPATIENT
Start: 2021-02-08 | End: 2021-02-09 | Stop reason: HOSPADM

## 2021-02-08 RX ORDER — CEFAZOLIN SODIUM/WATER 2 G/20 ML
2 SYRINGE (ML) INTRAVENOUS
Status: COMPLETED | OUTPATIENT
Start: 2021-02-08 | End: 2021-02-08

## 2021-02-08 RX ORDER — ZOLPIDEM TARTRATE 5 MG/1
5 TABLET ORAL
Status: DISCONTINUED | OUTPATIENT
Start: 2021-02-08 | End: 2021-02-09 | Stop reason: HOSPADM

## 2021-02-08 RX ORDER — MIDAZOLAM HYDROCHLORIDE 1 MG/ML
2 INJECTION, SOLUTION INTRAMUSCULAR; INTRAVENOUS
Status: DISCONTINUED | OUTPATIENT
Start: 2021-02-08 | End: 2021-02-08 | Stop reason: HOSPADM

## 2021-02-08 RX ORDER — LIDOCAINE HYDROCHLORIDE 10 MG/ML
0.1 INJECTION INFILTRATION; PERINEURAL AS NEEDED
Status: DISCONTINUED | OUTPATIENT
Start: 2021-02-08 | End: 2021-02-08 | Stop reason: HOSPADM

## 2021-02-08 RX ORDER — HYDROCODONE BITARTRATE AND ACETAMINOPHEN 5; 325 MG/1; MG/1
1 TABLET ORAL
Status: DISCONTINUED | OUTPATIENT
Start: 2021-02-08 | End: 2021-02-09 | Stop reason: HOSPADM

## 2021-02-08 RX ORDER — ONDANSETRON 2 MG/ML
INJECTION INTRAMUSCULAR; INTRAVENOUS AS NEEDED
Status: DISCONTINUED | OUTPATIENT
Start: 2021-02-08 | End: 2021-02-08 | Stop reason: HOSPADM

## 2021-02-08 RX ORDER — SCOLOPAMINE TRANSDERMAL SYSTEM 1 MG/1
1 PATCH, EXTENDED RELEASE TRANSDERMAL ONCE
Status: DISCONTINUED | OUTPATIENT
Start: 2021-02-08 | End: 2021-02-09 | Stop reason: HOSPADM

## 2021-02-08 RX ORDER — FENTANYL CITRATE 50 UG/ML
INJECTION, SOLUTION INTRAMUSCULAR; INTRAVENOUS AS NEEDED
Status: DISCONTINUED | OUTPATIENT
Start: 2021-02-08 | End: 2021-02-08 | Stop reason: HOSPADM

## 2021-02-08 RX ORDER — HYDROMORPHONE HYDROCHLORIDE 2 MG/ML
0.5 INJECTION, SOLUTION INTRAMUSCULAR; INTRAVENOUS; SUBCUTANEOUS
Status: DISCONTINUED | OUTPATIENT
Start: 2021-02-08 | End: 2021-02-08 | Stop reason: HOSPADM

## 2021-02-08 RX ORDER — GLYCOPYRROLATE 0.2 MG/ML
INJECTION INTRAMUSCULAR; INTRAVENOUS AS NEEDED
Status: DISCONTINUED | OUTPATIENT
Start: 2021-02-08 | End: 2021-02-08 | Stop reason: HOSPADM

## 2021-02-08 RX ORDER — LORATADINE 10 MG/1
10 TABLET ORAL DAILY
Status: DISCONTINUED | OUTPATIENT
Start: 2021-02-09 | End: 2021-02-09 | Stop reason: HOSPADM

## 2021-02-08 RX ORDER — ROCURONIUM BROMIDE 10 MG/ML
INJECTION, SOLUTION INTRAVENOUS AS NEEDED
Status: DISCONTINUED | OUTPATIENT
Start: 2021-02-08 | End: 2021-02-08 | Stop reason: HOSPADM

## 2021-02-08 RX ORDER — BUPIVACAINE HYDROCHLORIDE 2.5 MG/ML
INJECTION, SOLUTION EPIDURAL; INFILTRATION; INTRACAUDAL AS NEEDED
Status: DISCONTINUED | OUTPATIENT
Start: 2021-02-08 | End: 2021-02-08 | Stop reason: HOSPADM

## 2021-02-08 RX ORDER — DEXTROSE MONOHYDRATE AND SODIUM CHLORIDE 5; .45 G/100ML; G/100ML
150 INJECTION, SOLUTION INTRAVENOUS CONTINUOUS
Status: DISCONTINUED | OUTPATIENT
Start: 2021-02-08 | End: 2021-02-09

## 2021-02-08 RX ORDER — OXYBUTYNIN CHLORIDE 5 MG/1
5 TABLET ORAL
Status: DISCONTINUED | OUTPATIENT
Start: 2021-02-08 | End: 2021-02-09 | Stop reason: HOSPADM

## 2021-02-08 RX ORDER — ONDANSETRON 2 MG/ML
4 INJECTION INTRAMUSCULAR; INTRAVENOUS
Status: DISCONTINUED | OUTPATIENT
Start: 2021-02-08 | End: 2021-02-09 | Stop reason: HOSPADM

## 2021-02-08 RX ORDER — DIPHENHYDRAMINE HYDROCHLORIDE 50 MG/ML
12.5 INJECTION, SOLUTION INTRAMUSCULAR; INTRAVENOUS
Status: DISCONTINUED | OUTPATIENT
Start: 2021-02-08 | End: 2021-02-09 | Stop reason: HOSPADM

## 2021-02-08 RX ORDER — ALBUTEROL SULFATE 0.83 MG/ML
2.5 SOLUTION RESPIRATORY (INHALATION)
Status: DISCONTINUED | OUTPATIENT
Start: 2021-02-08 | End: 2021-02-09 | Stop reason: HOSPADM

## 2021-02-08 RX ORDER — LIDOCAINE HYDROCHLORIDE 20 MG/ML
INJECTION, SOLUTION EPIDURAL; INFILTRATION; INTRACAUDAL; PERINEURAL AS NEEDED
Status: DISCONTINUED | OUTPATIENT
Start: 2021-02-08 | End: 2021-02-08 | Stop reason: HOSPADM

## 2021-02-08 RX ORDER — ACETAMINOPHEN 500 MG
1000 TABLET ORAL ONCE
Status: COMPLETED | OUTPATIENT
Start: 2021-02-08 | End: 2021-02-08

## 2021-02-08 RX ORDER — EPHEDRINE SULFATE/0.9% NACL/PF 50 MG/5 ML
SYRINGE (ML) INTRAVENOUS AS NEEDED
Status: DISCONTINUED | OUTPATIENT
Start: 2021-02-08 | End: 2021-02-08 | Stop reason: HOSPADM

## 2021-02-08 RX ORDER — ESMOLOL HYDROCHLORIDE 10 MG/ML
INJECTION INTRAVENOUS AS NEEDED
Status: DISCONTINUED | OUTPATIENT
Start: 2021-02-08 | End: 2021-02-08 | Stop reason: HOSPADM

## 2021-02-08 RX ORDER — PROPOFOL 10 MG/ML
INJECTION, EMULSION INTRAVENOUS AS NEEDED
Status: DISCONTINUED | OUTPATIENT
Start: 2021-02-08 | End: 2021-02-08 | Stop reason: HOSPADM

## 2021-02-08 RX ORDER — LOSARTAN POTASSIUM 50 MG/1
50 TABLET ORAL DAILY
Status: DISCONTINUED | OUTPATIENT
Start: 2021-02-08 | End: 2021-02-09 | Stop reason: HOSPADM

## 2021-02-08 RX ORDER — SODIUM CHLORIDE, SODIUM LACTATE, POTASSIUM CHLORIDE, CALCIUM CHLORIDE 600; 310; 30; 20 MG/100ML; MG/100ML; MG/100ML; MG/100ML
75 INJECTION, SOLUTION INTRAVENOUS CONTINUOUS
Status: DISCONTINUED | OUTPATIENT
Start: 2021-02-08 | End: 2021-02-08 | Stop reason: HOSPADM

## 2021-02-08 RX ORDER — DEXAMETHASONE SODIUM PHOSPHATE 4 MG/ML
INJECTION, SOLUTION INTRA-ARTICULAR; INTRALESIONAL; INTRAMUSCULAR; INTRAVENOUS; SOFT TISSUE AS NEEDED
Status: DISCONTINUED | OUTPATIENT
Start: 2021-02-08 | End: 2021-02-08 | Stop reason: HOSPADM

## 2021-02-08 RX ORDER — MORPHINE SULFATE 2 MG/ML
2 INJECTION, SOLUTION INTRAMUSCULAR; INTRAVENOUS
Status: DISCONTINUED | OUTPATIENT
Start: 2021-02-08 | End: 2021-02-09 | Stop reason: HOSPADM

## 2021-02-08 RX ADMIN — SODIUM CHLORIDE, SODIUM LACTATE, POTASSIUM CHLORIDE, AND CALCIUM CHLORIDE 25 ML/HR: 600; 310; 30; 20 INJECTION, SOLUTION INTRAVENOUS at 06:27

## 2021-02-08 RX ADMIN — DEXTROSE MONOHYDRATE AND SODIUM CHLORIDE 150 ML/HR: 5; .45 INJECTION, SOLUTION INTRAVENOUS at 12:35

## 2021-02-08 RX ADMIN — Medication 2 G: at 08:01

## 2021-02-08 RX ADMIN — HYDROCODONE BITARTRATE AND ACETAMINOPHEN 1 TABLET: 5; 325 TABLET ORAL at 21:39

## 2021-02-08 RX ADMIN — GLYCOPYRROLATE 0.2 MG: 0.2 INJECTION, SOLUTION INTRAMUSCULAR; INTRAVENOUS at 08:38

## 2021-02-08 RX ADMIN — ROCURONIUM BROMIDE 10 MG: 10 INJECTION, SOLUTION INTRAVENOUS at 09:01

## 2021-02-08 RX ADMIN — GLYCOPYRROLATE 0.8 MG: 0.2 INJECTION, SOLUTION INTRAMUSCULAR; INTRAVENOUS at 09:52

## 2021-02-08 RX ADMIN — MORPHINE SULFATE 2 MG: 2 INJECTION, SOLUTION INTRAMUSCULAR; INTRAVENOUS at 12:07

## 2021-02-08 RX ADMIN — ROCURONIUM BROMIDE 10 MG: 10 INJECTION, SOLUTION INTRAVENOUS at 08:24

## 2021-02-08 RX ADMIN — ACETAMINOPHEN 1000 MG: 500 TABLET ORAL at 06:29

## 2021-02-08 RX ADMIN — CEFAZOLIN 1 G: 1 INJECTION, POWDER, FOR SOLUTION INTRAMUSCULAR; INTRAVENOUS at 15:19

## 2021-02-08 RX ADMIN — Medication 10 MG: at 08:19

## 2021-02-08 RX ADMIN — MIDAZOLAM 2 MG: 1 INJECTION INTRAMUSCULAR; INTRAVENOUS at 07:39

## 2021-02-08 RX ADMIN — DOCUSATE SODIUM 100 MG: 100 CAPSULE ORAL at 15:19

## 2021-02-08 RX ADMIN — LIDOCAINE HYDROCHLORIDE 80 MG: 20 INJECTION, SOLUTION EPIDURAL; INFILTRATION; INTRACAUDAL; PERINEURAL at 07:50

## 2021-02-08 RX ADMIN — Medication 5 MG: at 09:52

## 2021-02-08 RX ADMIN — FENTANYL CITRATE 50 MCG: 50 INJECTION INTRAMUSCULAR; INTRAVENOUS at 09:25

## 2021-02-08 RX ADMIN — ZOLPIDEM TARTRATE 5 MG: 5 TABLET ORAL at 21:39

## 2021-02-08 RX ADMIN — ESMOLOL HYDROCHLORIDE 20 MG: 10 INJECTION, SOLUTION INTRAVENOUS at 09:55

## 2021-02-08 RX ADMIN — FENTANYL CITRATE 50 MCG: 50 INJECTION INTRAMUSCULAR; INTRAVENOUS at 08:46

## 2021-02-08 RX ADMIN — DOCUSATE SODIUM 100 MG: 100 CAPSULE ORAL at 18:00

## 2021-02-08 RX ADMIN — FENTANYL CITRATE 50 MCG: 50 INJECTION INTRAMUSCULAR; INTRAVENOUS at 07:50

## 2021-02-08 RX ADMIN — FENTANYL CITRATE 50 MCG: 50 INJECTION INTRAMUSCULAR; INTRAVENOUS at 09:47

## 2021-02-08 RX ADMIN — ROCURONIUM BROMIDE 40 MG: 10 INJECTION, SOLUTION INTRAVENOUS at 07:51

## 2021-02-08 RX ADMIN — MORPHINE SULFATE 2 MG: 2 INJECTION, SOLUTION INTRAMUSCULAR; INTRAVENOUS at 15:19

## 2021-02-08 RX ADMIN — SODIUM CHLORIDE, SODIUM LACTATE, POTASSIUM CHLORIDE, AND CALCIUM CHLORIDE: 600; 310; 30; 20 INJECTION, SOLUTION INTRAVENOUS at 09:43

## 2021-02-08 RX ADMIN — PROPOFOL 160 MG: 10 INJECTION, EMULSION INTRAVENOUS at 07:50

## 2021-02-08 RX ADMIN — DEXAMETHASONE SODIUM PHOSPHATE 10 MG: 4 INJECTION, SOLUTION INTRAMUSCULAR; INTRAVENOUS at 08:02

## 2021-02-08 RX ADMIN — LOSARTAN POTASSIUM 50 MG: 50 TABLET, FILM COATED ORAL at 12:07

## 2021-02-08 RX ADMIN — ONDANSETRON 4 MG: 2 INJECTION INTRAMUSCULAR; INTRAVENOUS at 08:02

## 2021-02-08 NOTE — OP NOTES
[address] [place] [address]  OPERATIVE REPORT    Name:  Lavonia Dakin  MR#:  [id number]  :  1965  ACCOUNT #:  [de-identified]  DATE OF SERVICE:  2021    PREOPERATIVE DIAGNOSIS:  Prostate cancer. POSTOPERATIVE DIAGNOSIS:  Prostate cancer. PROCEDURE PERFORMED:  Robotic-assisted laparoscopic radical prostatectomy. SURGEON:  Sonny Ramirez DO    ASSISTANT:  None. ANESTHESIA:  General.    COMPLICATIONS:  None immediate. SPECIMENS REMOVED:  Prostate. IMPLANTS:  None. ESTIMATED BLOOD LOSS:  75 mL. CLINICAL HISTORY:  This is a 52-year-old gentleman who was recently diagnosed with [facility] 6 adenocarcinoma of the prostate on 2020. His pre-biopsy PSA is 4.6 and clinical stage is T1c. All risks, benefits, and alternatives to the above-mentioned procedure have been reviewed including the option for active surveillance and he has elected to proceed. PROCEDURE:  Patient consent was obtained. The patient was brought back to the operating room at which time he was placed in the supine position. After the uneventful induction of general anesthesia, he was then placed in a low lithotomy position. His genital and abdominal areas were prepped and draped and a sterile field applied. An 18-Uzbek Sinha catheter was placed to dependent drainage. A small periumbilical incision was made and a Veress needle was inserted into the abdominal cavity without event. The abdomen was then insufflated with CO2. Ports were then placed in a standard robotic prostatectomy fashion under direct vision. The patient was then placed in a steep Trendelenburg position and the robot was docked. The space of Retzius was entered by dropping the bladder posteriorly. The endopelvic fascia was incised bilaterally. The dorsal venous complex was dissected out and oversewn using 2-0 Vicryl in a figure-of-eight fashion. A back stitch was also placed near the bladder neck.   The bladder neck was then transected from the prostate using electrocautery. The catheter was identified in the midline and retracted anteriorly to aid in posterior dissection. Following division of the posterior bladder neck, dissection then carried posteriorly to the seminal vesicles and vas deferens bilaterally. These structures were dissected out and divided. A bilateral retrograde nerve-sparing procedure was then performed using athermal technique. Pedicles were controlled using Lapro-Clips, once again utilizing an athermal technique. The neurovascular bundles in the rectum were then swept off the prostate bluntly in an antegrade fashion. The dorsal vein was divided using electrocautery and the urethra was transected using the robotic scissors. The prostate was then placed within an EndoCatch bag and placed within the lower pelvis. The pelvis was thoroughly irrigated. No active bleeding was seen. The urethrovesical anastomosis was accomplished using 3-0 double-arm Monocryl in a running fashion. A 20-[place] Sinha catheter was placed at the completion of this anastomosis. The bladder was thoroughly irrigated. No obvious leak was seen. The bladder was then tacked anteriorly to the endopelvic fascia bilaterally using 3-0 Vicryl in a simple interrupted fashion. The intra-abdominal pressures were lowered. No active bleeding was seen. No injury was seen in the lower pelvis. The intra-abdominal pressures were raised and the robot was undocked. The prostate was removed through the periumbilical incision and a 15 BORA drain was placed through the 5-mm port site and sewn in place using 2-0 nylon suture. The midline fascia was closed using 0 Vicryl in a running fashion. The skin was closed here using 4-0 Monocryl in a running subcuticular fashion. All other port sites were closed using Dermabond. The patient tolerated the procedure well. Estimated blood loss was 75 mL.       TREVER PATE,       [id number]  D: 02/08/2021 10:22  T:  02/08/2021 15:52  JOB #:  [id number]

## 2021-02-08 NOTE — PROGRESS NOTES
02/08/21 1158   Dual Skin Pressure Injury Assessment   Dual Skin Pressure Injury Assessment WDL   Second Care Provider (Based on [address] [place] [address]) [address] [facility],[address], RN     Lap sites with skin glue noted, right gloria drain. Skin clean, dry and intact. Sacrum and heels intact.

## 2021-02-08 NOTE — ANESTHESIA POSTPROCEDURE EVALUATION
Procedure(s):  PROSTATECTOMY ROBOTIC ASSISTED  POSS PELVIC LYMPH NODE DISSECTION. general    Anesthesia Post Evaluation      Multimodal analgesia: multimodal analgesia used between 6 hours prior to anesthesia start to PACU discharge  Patient location during evaluation: bedside  Patient participation: complete - patient participated  Level of consciousness: awake and alert  Pain management: adequate  Airway patency: patent  Anesthetic complications: no  Cardiovascular status: acceptable  Respiratory status: acceptable  Hydration status: acceptable  Post anesthesia nausea and vomiting:  controlled  Final Post Anesthesia Temperature Assessment:  Normothermia (36.0-37.5 degrees C)      INITIAL Post-op Vital signs:   Vitals Value Taken Time   /96 02/08/21 1114   Temp 37.2 °C (98.9 °F) 02/08/21 1045   Pulse 80 02/08/21 1114   Resp 14 02/08/21 1104   SpO2 99 % 02/08/21 1114   Vitals shown include unvalidated device data.

## 2021-02-08 NOTE — BRIEF OP NOTE
Brief Postoperative Note    Patient: Darreld Bence  YOB: 1965  MRN: [id number]    Date of Procedure: 2/8/2021     Pre-Op Diagnosis: Prostate cancer ([facility] [address] Paul Schulte    Post-Op Diagnosis: Prostate cancer    Procedure(s):  LAP RADICAL PROSTATECTOMY ROBOTIC ASSISTED      Surgeon(s):  Rickie Ramirez DO    Surgical Assistant: None    Anesthesia: General     Estimated Blood Loss (mL): 78SH    Complications: none immediate    Specimens:   ID Type Source Tests Collected by Time Destination   1 : prostate Preservative Prostate  Abdirashid Rosenbaum DO 2/8/2021 0919 Pathology        Implants: * No implants in log *    Drains:   Pablo-Jones Drain 02/08/21 Right Abdomen (Active)       Orogastric Tube 02/08/21 (Active)   Site Assessment Clean, dry, & intact 02/08/21 0758   G Port Status Intermittent Suction 02/08/21 0758       Findings: see op note    Electronically Signed by Deeanna Angelucci, DO on 2/8/2021 at 10:09 AM

## 2021-02-08 NOTE — PERIOP NOTES
TRANSFER - OUT REPORT:    Verbal report given to Olive Elena on Jamia Garcia  being transferred to [phone] for routine post - op       Report consisted of patients Situation, Background, Assessment and   Recommendations(SBAR). Information from the following report(s) OR Summary, Procedure Summary, Intake/Output and MAR was reviewed with the receiving nurse. Lines:   Peripheral IV 02/08/21 Left;Posterior Hand (Active)   Site Assessment Clean, dry, & intact 02/08/21 1044   Phlebitis Assessment 0 02/08/21 1044   Infiltration Assessment 0 02/08/21 1044   Dressing Status Clean, dry, & intact 02/08/21 1044   Dressing Type Transparent;Tape 02/08/21 1044   Hub Color/Line Status Green; Infusing 02/08/21 0611       Peripheral IV 02/08/21 Left Wrist (Active)   Site Assessment Clean, dry, & intact 02/08/21 1044   Phlebitis Assessment 0 02/08/21 1044   Infiltration Assessment 0 02/08/21 1044   Dressing Status Clean, dry, & intact 02/08/21 1044   Dressing Type Transparent;Tape 02/08/21 1044   Hub Color/Line Status Infusing 02/08/21 1044   Alcohol Cap Used No 02/08/21 1044        Opportunity for questions and clarification was provided. Patient transported with:   O2 @ 2 liters    VTE prophylaxis orders have been written for Jamia Garcia. Patient and family given floor number and nurses name. Family updated re: pt status after security code verified.

## 2021-02-08 NOTE — PROGRESS NOTES
TRANSFER - IN REPORT:    Verbal report received from [facility], RN on Ceasar Snow  being received from PACU for routine progression of care      Report consisted of patients Situation, Background, Assessment and   Recommendations(SBAR). Information from the following report(s) SBAR, OR Summary and MAR was reviewed with the receiving nurse. Opportunity for questions and clarification was provided. Assessment completed upon patients arrival to unit and care assumed.

## 2021-02-08 NOTE — ANESTHESIA PREPROCEDURE EVALUATION
Anesthetic History   No history of anesthetic complications            Review of Systems / Medical History  Patient summary reviewed, nursing notes reviewed and pertinent labs reviewed    Pulmonary                Comments: Allergic rhinitis, bronchitis associated asthma   Neuro/Psych   Within defined limits           Cardiovascular    Hypertension: well controlled              Exercise tolerance: >4 METS     GI/Hepatic/Renal  Within defined limits              Endo/Other        Cancer (Prostate cancer)     Other Findings              Physical Exam    Airway  Mallampati: II  TM Distance: > 6 cm  Neck ROM: normal range of motion   Mouth opening: Normal     Cardiovascular    Rhythm: regular  Rate: normal         Dental  No notable dental hx       Pulmonary  Breath sounds clear to auscultation               Abdominal         Other Findings            Anesthetic Plan    ASA: 2  Anesthesia type: general            Anesthetic plan and risks discussed with: Patient

## 2021-02-08 NOTE — PROGRESS NOTES
Hourly rounding completely during shift. All needs met at this time. Bed L/L with call bell in reach. Report will be given to oncoming RN.

## 2021-02-08 NOTE — PROGRESS NOTES
CM attempted to see patient this day, to complete assessment. Patient is being assisted by floor staff at this time. CM introduced role of  SUJATHA. No needs voiced at this time. Chart screened by  for discharge planning. No needs identified. PT/OT has not been consulted. Patient will likely return home at time of discharge. CM continues to monitor the patient during this hospitalization. Please consult or notify  if any new issues arise. Care Management Interventions  Mode of Transport at Discharge:  Other (see comment)(TBD)  Transition of Care Consult (CM Consult): Discharge Planning  Discharge Durable Medical Equipment: No  Physical Therapy Consult: No  Occupational Therapy Consult: No  Speech Therapy Consult: No  Current Support Network: Own Home  Confirm Follow Up Transport: Self  Discharge Location  Discharge Placement: Home

## 2021-02-09 VITALS
OXYGEN SATURATION: 99 % | HEIGHT: 70 IN | WEIGHT: 164.6 LBS | SYSTOLIC BLOOD PRESSURE: 154 MMHG | RESPIRATION RATE: 18 BRPM | BODY MASS INDEX: 23.56 KG/M2 | HEART RATE: 58 BPM | DIASTOLIC BLOOD PRESSURE: 69 MMHG | TEMPERATURE: 98.4 F

## 2021-02-09 LAB
ANION GAP SERPL CALC-SCNC: 5 MMOL/L (ref 7–16)
BUN SERPL-MCNC: 8 MG/DL (ref 6–23)
CALCIUM SERPL-MCNC: 8.5 MG/DL (ref 8.3–10.4)
CHLORIDE SERPL-SCNC: 108 MMOL/L (ref 98–107)
CO2 SERPL-SCNC: 29 MMOL/L (ref 21–32)
CREAT SERPL-MCNC: 0.98 MG/DL (ref 0.8–1.5)
GLUCOSE SERPL-MCNC: 114 MG/DL (ref 65–100)
HCT VFR BLD AUTO: 41.4 % (ref 41.1–50.3)
HGB BLD-MCNC: 14.1 G/DL (ref 13.6–17.2)
POTASSIUM SERPL-SCNC: 3.7 MMOL/L (ref 3.5–5.1)
SODIUM SERPL-SCNC: 142 MMOL/L (ref 136–145)

## 2021-02-09 PROCEDURE — 74011250636 HC RX REV CODE- 250/636: Performed by: UROLOGY

## 2021-02-09 PROCEDURE — 80048 BASIC METABOLIC PNL TOTAL CA: CPT

## 2021-02-09 PROCEDURE — 74011000258 HC RX REV CODE- 258: Performed by: UROLOGY

## 2021-02-09 PROCEDURE — 74011250637 HC RX REV CODE- 250/637: Performed by: UROLOGY

## 2021-02-09 PROCEDURE — 36415 COLL VENOUS BLD VENIPUNCTURE: CPT

## 2021-02-09 PROCEDURE — 2709999900 HC NON-CHARGEABLE SUPPLY

## 2021-02-09 PROCEDURE — 74011000258 HC RX REV CODE- 258: Performed by: NURSE PRACTITIONER

## 2021-02-09 PROCEDURE — 77030027138 HC INCENT SPIROMETER -A

## 2021-02-09 PROCEDURE — 85018 HEMOGLOBIN: CPT

## 2021-02-09 RX ORDER — SULFAMETHOXAZOLE AND TRIMETHOPRIM 800; 160 MG/1; MG/1
1 TABLET ORAL 2 TIMES DAILY
Qty: 14 TAB | Refills: 0 | Status: SHIPPED | OUTPATIENT
Start: 2021-02-09 | End: 2021-03-03 | Stop reason: ALTCHOICE

## 2021-02-09 RX ORDER — DEXTROSE MONOHYDRATE AND SODIUM CHLORIDE 5; .45 G/100ML; G/100ML
75 INJECTION, SOLUTION INTRAVENOUS CONTINUOUS
Status: DISCONTINUED | OUTPATIENT
Start: 2021-02-09 | End: 2021-02-09 | Stop reason: HOSPADM

## 2021-02-09 RX ORDER — HYDROCODONE BITARTRATE AND ACETAMINOPHEN 5; 325 MG/1; MG/1
1 TABLET ORAL
Qty: 20 TAB | Refills: 0 | Status: SHIPPED | OUTPATIENT
Start: 2021-02-09 | End: 2021-02-12

## 2021-02-09 RX ORDER — DOCUSATE SODIUM 100 MG/1
100 CAPSULE, LIQUID FILLED ORAL 2 TIMES DAILY
Qty: 60 CAP | Refills: 2 | Status: SHIPPED | OUTPATIENT
Start: 2021-02-09 | End: 2021-05-10

## 2021-02-09 RX ADMIN — HYDROCODONE BITARTRATE AND ACETAMINOPHEN 1 TABLET: 5; 325 TABLET ORAL at 15:07

## 2021-02-09 RX ADMIN — LOSARTAN POTASSIUM 50 MG: 50 TABLET, FILM COATED ORAL at 08:29

## 2021-02-09 RX ADMIN — MORPHINE SULFATE 2 MG: 2 INJECTION, SOLUTION INTRAMUSCULAR; INTRAVENOUS at 00:09

## 2021-02-09 RX ADMIN — DOCUSATE SODIUM 100 MG: 100 CAPSULE ORAL at 08:27

## 2021-02-09 RX ADMIN — DEXTROSE MONOHYDRATE AND SODIUM CHLORIDE 150 ML/HR: 5; .45 INJECTION, SOLUTION INTRAVENOUS at 05:52

## 2021-02-09 RX ADMIN — CEFAZOLIN 1 G: 1 INJECTION, POWDER, FOR SOLUTION INTRAMUSCULAR; INTRAVENOUS at 00:04

## 2021-02-09 RX ADMIN — HYDROCODONE BITARTRATE AND ACETAMINOPHEN 1 TABLET: 5; 325 TABLET ORAL at 05:52

## 2021-02-09 RX ADMIN — LORATADINE 10 MG: 10 TABLET ORAL at 08:29

## 2021-02-09 RX ADMIN — HYDROCODONE BITARTRATE AND ACETAMINOPHEN 1 TABLET: 5; 325 TABLET ORAL at 10:39

## 2021-02-09 NOTE — PROGRESS NOTES
Admit Date: 2/8/2021    Subjective:     Brit Mcmahon is resting. No complaints. Eager to ambulate. He has not passed flatus. Tolerating clears. VSS. Objective:     Patient Vitals for the past 8 hrs:   BP Temp Pulse Resp SpO2   02/09/21 0746 134/79 98.6 °F (37 °C) 60 18 99 %   02/09/21 0347 (!) 150/82 98.3 °F (36.8 °C) 66 18 98 %     02/09 0701 - 02/09 1900  In: -   Out: 25 [Drains:25]  02/07 1901 - 02/09 0700  In: 1100 [I.V.:1100]  Out: 4300 [Urine:3900; Drains:300]    Physical Exam:  GENERAL: alert, cooperative, no distress  LUNG: clear to auscultation bilaterally  HEART: regular rate and rhythm, S1, S2   ABDOMEN: soft, non-tender, port sites c/d/i, BORA with SS OP  NEUROLOGIC: AOx3    Data Review   Recent Results (from the past 24 hour(s))   HGB & HCT    Collection Time: 02/08/21  5:25 PM   Result Value Ref Range    HGB 15.0 13.6 - 17.2 g/dL    HCT 43.1 41.1 - 56.1 %   METABOLIC PANEL, BASIC    Collection Time: 02/09/21  6:02 AM   Result Value Ref Range    Sodium 142 136 - 145 mmol/L    Potassium 3.7 3.5 - 5.1 mmol/L    Chloride 108 (H) 98 - 107 mmol/L    CO2 29 21 - 32 mmol/L    Anion gap 5 (L) 7 - 16 mmol/L    Glucose 114 (H) 65 - 100 mg/dL    BUN 8 6 - 23 MG/DL    Creatinine 0.98 0.8 - 1.5 MG/DL    GFR est AA >60 >60 ml/min/1.73m2    GFR est non-AA >60 >60 ml/min/1.73m2    Calcium 8.5 8.3 - 10.4 MG/DL   HGB & HCT    Collection Time: 02/09/21  6:02 AM   Result Value Ref Range    HGB 14.1 13.6 - 17.2 g/dL    HCT 41.4 41.1 - 50.3 %       Assessment:     Active Problems:    Prostate cancer ([facility] [address] (2/8/2021)      POD 1:    POSTOPERATIVE DIAGNOSIS:  Prostate cancer.     PROCEDURE PERFORMED:  Robotic-assisted laparoscopic radical prostatectomy. Afebrile, VSS  Cn 0.98  Hgb 14.1  BORA-130 ml OP/12 hours    Plan:     Monitor BORA OP.     Continue holliday catheter care teaching.   Do not manipulate holliday catheter.     Ambulate pt.     Continue clear liquids, advance diet once he is passing flatus.     Encourage incentive spirometer use every hour. Pt demonstrated use.     Decrease IVF to 75 ml/hr.     Home later today if he continues to progress    Lisbeth Artis NP  [facility]    I have reviewed the above note and examined the patient. I agree with the exam, assessment and plan. Doing well. Denies flatus. AF.  VSS. UOP stable and clear. BORA min. Labs reviewed. Abd soft. Ports c/d/i. S/P RALP POD#1. Await flatus to advance diet. Remove BORA. Home later with Sinha if progressing.     Kalen Ramirez, DO

## 2021-02-09 NOTE — PROGRESS NOTES
CM met with patient with PPE in place, with patient's guest at bedside, to discuss discharge needs. Patient denies any needs at this time. Patient to discharge home this day. Patient's family, will transport the patient home. Please consult or notify CM if any needs shall arise. CM remains available. Care Management Interventions  Mode of Transport at Discharge:  Other (see comment)(Family )  Transition of Care Consult (CM Consult): Discharge Planning  Discharge Durable Medical Equipment: No  Physical Therapy Consult: No  Occupational Therapy Consult: No  Speech Therapy Consult: No  Current Support Network: Own Home  Confirm Follow Up Transport: Self  Discharge Location  Discharge Placement: Home

## 2021-02-09 NOTE — PROGRESS NOTES
Pt continues to tolerate clears. Has walked several times. Feeling well. BORA OP decreased. RN to remove BORA drain. If pt tolerates well, he will d/c home today on clear liquids and advance diet as tolerated. RN to teach holliday care. He will RTO as scheduled.

## 2021-03-01 ENCOUNTER — HOSPITAL ENCOUNTER (OUTPATIENT)
Dept: PHYSICAL THERAPY | Age: 56
Discharge: HOME | End: 2021-03-01
Attending: UROLOGY
Payer: COMMERCIAL

## 2021-03-01 PROCEDURE — 97110 THERAPEUTIC EXERCISES: CPT

## 2021-03-01 NOTE — PROGRESS NOTES
Joon Pump  : 1965  Primary: Madi[facility] [place] [facility]*  Secondary:  [address] at [address]  [phone]   [phone]         OUTPATIENT PHYSICAL THERAPY: Daily Treatment Note 3/1/2021     ICD-10: Treatment Diagnosis: R27.8 Lack of coordination (muscle incoordination)  Precautions/Allergies:   Patient has no known allergies. TREATMENT PLAN:  Effective Dates: 2021 TO 2021 (90 days). Frequency/Duration: Follow up 3 weeks post-op, then resume PT 1 x weekly. Duration to be determined post-operatively. MEDICAL/REFERRING DIAGNOSIS:  Malignant neoplasm of prostate ([facility] [address] [C61]   DATE OF ONSET: -  REFERRING PHYSICIAN: Karena Gonzales DO MD Orders: evaluate and treat  Return MD Appointment:3-3-21       Pre-treatment Symptoms/Complaints:  Patient reports surgery went well. Burning occurs with urination sometimes. Reports numbness in the pelvic region. Passing stool and gas is good. Having trouble finding a comfortable position to sleep. Last week was having trouble having a bowel movement. He got milk of magnesia which helped. Pain: Initial:   0 (burning) Post Session:  0/10   Medications Last Reviewed:  3/1/2021  Updated Objective Findings:    Urinary: Frequency 5-8 x/day, 2-3 x/night. Positive for stress urinary incontinence, dysuria, nocturia  . Negative for urge urinary incontinence, urinary urgency, urinary frequency, incomplete emptying, urinary hesitancy/intermittency, hematuria, and nocturnal enuresis. Pt does use pads for protection; 1 pads per day (PPD). Fluid intake: 6 bottle water/day; bladder irritants include: coffee (2 cups),  hot tea  Bowel: Frequency twice a day. Positive for none. Negative for pain with bowel movement, pushing/straining with bowel movement, fecal incontinence, constipation and incomplete emptying. Pt does not use pads for protection; 0 pads per day (PPD).   Use of stool softeners or laxatives? YES  Sexual: Pt is sexually active with female partners. negative for dyspareunia. History of sexual abuse:  NO:          Pelvic Pain: none    External Observation:   · Voluntary Contraction: present  · Voluntary Relaxation: present        Pelvic Floor Muscle (PFM) Assessment: external palpation  · Muscle volume: []? decreased     [x]? WNL     []? Defect  · PFM tension: []? decreased     []? increased     [x]? WNL     Contraction ability (MMT performed via external palpation):  Voluntary contraction: []? absent     []? weak     [x]? moderate      []? strong  Voluntary relaxation: []? absent     []? partial     [x]? complete   Overflow: []? absent     [x]? min     []? mod     []? severe / Compensatory mm groups include abdominals   TREATMENT:   THERAPEUTIC EXERCISE: (45 minutes):  Exercises per grid below to improve mobility, strength, and coordination. Required minimal visual, verbal, and manual cues to promote proper body alignment, promote proper body posture, promote proper body mechanics, and promote proper body breathing techniques. Progressed resistance, range, repetitions, and complexity of movement as indicated. Date:  2-1-21 Date:  3-1-21 Date:     Activity/Exercise Parameters Parameters Parameters   Kegel Exercises Supine, seated, standing  Reviewed     Bladder/bowel health Bladder health, toilet positioning      2nd position bridge  x20      clamshells  x20 B green band    Seated March  x20 B green band    Sit to Stand  x20 Green band      Quadruped leg extension  x20 B        Treatment/Session Summary:    · Response to Treatment:  Pt reports good understanding of plan of care, as well as prescribed home exercise program.  All questions were answered to pt's satisfaction. Pt was invited to call with any further questions or concerns.   · Communication/Consultation:  None today  · Equipment provided today:  None today  · Recommendations/Intent for next treatment session: Next visit will focus on update subjective, biofeedback, kegel + movement.   Total Treatment Billable Duration:  40 minutes there ex  PT Patient Time In/Time Out  Time In: 0900  Time Out: Carlee Hunt DPT    Future Appointments   Date Time Provider Kin Hugo   3/3/2021  9:20 AM Jesse Vasquez MD  [address] [facility] [address] [facility]   3/8/2021  9:00 AM Chapin Ortiz, DPT [facility]   3/15/2021  9:00 AM Chapin Ortiz, DPT [facility]   3/22/2021  9:00 AM Chapin Ortiz, DPT [facility]   3/29/2021  9:00 AM Chapin Ortiz, DPT [facility]   4/5/2021  9:00 AM Chapin Ortiz, DPT [facility]   5/27/2021 12:00 PM [id number] BLOOD DRAW [id number] PGU   6/3/2021  2:00 PM Kalen Ramirez,  [id number] PGU

## 2021-03-01 NOTE — THERAPY RECERTIFICATION
Jamia Garcia : 1965 Primary: [facility] [facility]* Secondary:  [facility] 
[address] [place] [address] [phone]   [phone] OUTPATIENT PHYSICAL THERAPY:Recertification 1841 ICD-10: Treatment Diagnosis: R27.8 Lack of coordination (muscle incoordination) Precautions/Allergies:  
Patient has no known allergies. TREATMENT PLAN: 
Effective Dates: 2021 TO 2021 (90 days). Frequency/Duration: Follow up 3 weeks post-op, then resume PT 1 x weekly. Duration to be determined post-operatively. MEDICAL/REFERRING DIAGNOSIS: 
Malignant neoplasm of prostate [C61] DATE OF ONSET: - 
REFERRING PHYSICIAN: Luly Ramirez DO 
MD Orders: evaluate and treat Return MD Appointment: 21 REASSESSMENT: Mr. Michael Quan has been seen in skilled PT from 21 to 3-1-21. Patient has attended 2 out of 2 scheduled visits, with 0 cancellation(s) and 0 no shows. Treatment has emphasized initiation of kegels, bladder health, toilet mechanics. Patient  continues to demonstrate deficits in PFM coordination and strength contributing to urinary incontinence. Pt has progressed with goals as indicated below at this point and will continue to benefit from skilled PT in order to achieve remaining goals and maximize functional outcomes in order to return to [facility]. INITIAL ASSESSMENT:  Mr. Michael Quan presents with decreased coordination, strength and endurance of pelvic floor muscle pre operatively. Today he was educated on bladder health, kegel exercises, pelvic floor anatomy and role of musculature and precautions with catheter post operatively. He required verbal cuing for proper activation and isolation of pelvic floor muscles. He was given kegel exercises to do at home prior to surgery and once catheter is removed. He was educated on pain after surgery and precautions with kegel exercises.  He was able to demonstrate improved coordination by the end of the session today. He will continue to benefit from skilled physical therapy to address above mentioned deficits and restore normal PFM function post operatively to minimize urinary incontinence PROBLEM LIST (Impacting functional limitations): 1. Decreased [place] with Home Exercise Program 
2. Decreased coordination 3. Decreased strength of pelvic floor which limits bladder control INTERVENTIONS PLANNED: 
1. Neuromuscular re-education 2. Biofeedback as needed 3. HEP 4. Bladder retraining 5. Bladder education 6. Therapeutic Activites 7. Therapeutic Exercise/Strengthening GOALS: (Goals have been discussed and agreed upon with patient.) Short-Term Functional Goals: Time Frame: 4 weeks CONTINUE 1. Pt will demonstrate I with basic PFM HEP to improve awareness, coordination, and timing of PFM. 2. Pt will increase water intake by 16 oz and decrease irritant consumption by 8 oz to improve bladder health and decrease UI. Discharge Goals: Time Frame: 8 weeks 1. Pt will demonstrate 10 quick flicks of the pelvic floor muscle group, without compensation, to implement urge suppression appropriately with urgency of urination and decrease the number of pads used per day. 2. Pt will increase PFM endurance to 10 seconds without compensation to improve standing tolerance without UI to > 1 hour. 3. Pt will demonstrate I with advanced core stabilization and general mobility program to facilitate carry over and I management of symptoms. Urinary: Frequency 5-8 x/day, 2-3 x/night. Positive for stress urinary incontinence, dysuria, nocturia  . Negative for urge urinary incontinence, urinary urgency, urinary frequency, incomplete emptying, urinary hesitancy/intermittency, hematuria, and nocturnal enuresis. Pt does use pads for protection; 1 pads per day (PPD). Fluid intake: 6 bottle water/day; bladder irritants include: coffee (2 cups),  hot tea Bowel: Frequency twice a day. Positive for none. Negative for pain with bowel movement, pushing/straining with bowel movement, fecal incontinence, constipation and incomplete emptying. Pt does not use pads for protection; 0 pads per day (PPD). Use of stool softeners or laxatives? YES Sexual: Pt is sexually active with female partners. negative for dyspareunia. History of sexual abuse:  NO:  
       Pelvic Pain: none External Observation: · Voluntary Contraction: present · Voluntary Relaxation: present 
  
  
Pelvic Floor Muscle (PFM) Assessment: external palpation · Muscle volume: []? decreased     [x]? WNL     []? Defect · PFM tension: []? decreased     []? increased     [x]? WNL 
  
Contraction ability (MMT performed via external palpation): 
Voluntary contraction: []? absent     []? weak     [x]? moderate      []? strong Voluntary relaxation: []? absent     []? partial     [x]? complete Overflow: []? absent     [x]? min     []? mod     []? severe / Compensatory mm groups include abdominals Outcome Measure: Tool used: Expanded Prostate Cancer Index Composite for Clinical Practice (EPIC-CP) Score:  Initial:  Urinary Incontinence symptom score: 0/12  Urinary Irritation/Obstruction symptoms score: 0/12  Bowel symptoms score: 0/12  Sexual symptoms score: 0/12  Vitality/hormonal symptoms score: 0/12  Overall Prostate Cancer QOL score: 0/60 Most Recent: X (Date: 3-1-21 )  Urinary Incontinence symptom score: 4/12  Urinary Irritation/Obstruction symptoms score: 2/12  Bowel symptoms score: 0/12  Sexual symptoms score: 0/12  Vitality/hormonal symptoms score: 1/12  Overall Prostate Cancer QOL score: 7/60 Interpretation of Score: This survey asks questions concerning certain urinary, bowel and sexual symptoms. Each question is scored on a 0-4 scale, 4 representing the greatest disability. There are 5 sub-scores, each out of 12 and a total overall symptom score out of 60.  The higher the score, suggests a higher disability. Medical Necessity:  
· Patient is expected to demonstrate progress in strength, coordination and functional technique to minimize UI following prostatectomy. Reason for Services/Other Comments: 
· Patient continues to require skilled intervention due to above mentioned deficits. Total Duration: PT Patient Time In/Time Out Time In: 0900 Time Out: 1000 Rehabilitation Potential For Stated Goals: Good Regarding Margie Ora FTYWI'T therapy, I certify that the treatment plan above will be carried out by a therapist or under their direction. Thank you for this referral, 
Ari Higuera DPT Referring Physician Signature: Abdirashid Rosenbaum, DO              Date

## 2021-03-03 ENCOUNTER — HOSPITAL ENCOUNTER (OUTPATIENT)
Dept: LAB | Age: 56
Discharge: HOME | End: 2021-03-03
Payer: COMMERCIAL

## 2021-03-03 LAB
ALBUMIN SERPL-MCNC: 4.2 G/DL (ref 3.5–5)
ALBUMIN/GLOB SERPL: 1.1 {RATIO} (ref 1.2–3.5)
ALP SERPL-CCNC: 64 U/L (ref 50–136)
ALT SERPL-CCNC: 23 U/L (ref 12–65)
ANION GAP SERPL CALC-SCNC: 4 MMOL/L (ref 7–16)
APPEARANCE UR: CLEAR
AST SERPL-CCNC: 13 U/L (ref 15–37)
BACTERIA URNS QL MICRO: 0 /HPF
BILIRUB SERPL-MCNC: 0.8 MG/DL (ref 0.2–1.1)
BILIRUB UR QL: NEGATIVE
BUN SERPL-MCNC: 9 MG/DL (ref 6–23)
CALCIUM SERPL-MCNC: 9.6 MG/DL (ref 8.3–10.4)
CASTS URNS QL MICRO: 0 /LPF
CHLORIDE SERPL-SCNC: 102 MMOL/L (ref 98–107)
CHOLEST SERPL-MCNC: 152 MG/DL
CO2 SERPL-SCNC: 31 MMOL/L (ref 21–32)
COLOR UR: YELLOW
CREAT SERPL-MCNC: 1.03 MG/DL (ref 0.8–1.5)
EPI CELLS #/AREA URNS HPF: 0 /HPF
ERYTHROCYTE [DISTWIDTH] IN BLOOD BY AUTOMATED COUNT: 12.1 % (ref 11.9–14.6)
GLOBULIN SER CALC-MCNC: 3.9 G/DL (ref 2.3–3.5)
GLUCOSE SERPL-MCNC: 89 MG/DL (ref 65–100)
GLUCOSE UR STRIP.AUTO-MCNC: NEGATIVE MG/DL
HCT VFR BLD AUTO: 44.1 % (ref 41.1–50.3)
HDLC SERPL-MCNC: 55 MG/DL (ref 40–60)
HDLC SERPL: 2.8 {RATIO}
HGB BLD-MCNC: 15.3 G/DL (ref 13.6–17.2)
HGB UR QL STRIP: ABNORMAL
KETONES UR QL STRIP.AUTO: NEGATIVE MG/DL
LDLC SERPL CALC-MCNC: 83 MG/DL
LEUKOCYTE ESTERASE UR QL STRIP.AUTO: NEGATIVE
LIPID PROFILE,FLP: NORMAL
MCH RBC QN AUTO: 32.6 PG (ref 26.1–32.9)
MCHC RBC AUTO-ENTMCNC: 34.7 G/DL (ref 31.4–35)
MCV RBC AUTO: 93.8 FL (ref 79.6–97.8)
NITRITE UR QL STRIP.AUTO: NEGATIVE
NRBC # BLD: 0 K/UL (ref 0–0.2)
PH UR STRIP: 6.5 [PH] (ref 5–9)
PLATELET # BLD AUTO: 277 K/UL (ref 150–450)
PMV BLD AUTO: 11.1 FL (ref 9.4–12.3)
POTASSIUM SERPL-SCNC: 4.1 MMOL/L (ref 3.5–5.1)
PROT SERPL-MCNC: 8.1 G/DL (ref 6.3–8.2)
PROT UR STRIP-MCNC: NEGATIVE MG/DL
RBC # BLD AUTO: 4.7 M/UL (ref 4.23–5.6)
RBC #/AREA URNS HPF: ABNORMAL /HPF
SODIUM SERPL-SCNC: 137 MMOL/L (ref 138–145)
SP GR UR REFRACTOMETRY: 1.01 (ref 1–1.02)
TRIGL SERPL-MCNC: 70 MG/DL (ref 35–150)
UROBILINOGEN UR QL STRIP.AUTO: 0.2 EU/DL (ref 0.2–1)
VLDLC SERPL CALC-MCNC: 14 MG/DL (ref 6–23)
WBC # BLD AUTO: 4.3 K/UL (ref 4.3–11.1)
WBC URNS QL MICRO: ABNORMAL /HPF

## 2021-03-03 PROCEDURE — 36415 COLL VENOUS BLD VENIPUNCTURE: CPT

## 2021-03-03 PROCEDURE — 80061 LIPID PANEL: CPT

## 2021-03-03 PROCEDURE — 80053 COMPREHEN METABOLIC PANEL: CPT

## 2021-03-03 PROCEDURE — 87086 URINE CULTURE/COLONY COUNT: CPT

## 2021-03-03 PROCEDURE — 85027 COMPLETE CBC AUTOMATED: CPT

## 2021-03-03 PROCEDURE — 81001 URINALYSIS AUTO W/SCOPE: CPT

## 2021-03-03 NOTE — PROGRESS NOTES
Lab results: He has a moderate amount of blood/CBCs/hematuria; he needs to: Drink mostly water, eliminate caffeinated drinks, and eliminate any over-the-counter anti-inflammatory medications; he needs a recheck of his urinalysis here for a lab visit only early next week; with continued hematuria, he may need to follow-up with his established urologist, Dr. Moisés Saunders; urine culture pending; thank you, Dr Levi

## 2021-03-05 LAB
BACTERIA SPEC CULT: NORMAL
SERVICE CMNT-IMP: NORMAL

## 2021-03-08 ENCOUNTER — HOSPITAL ENCOUNTER (OUTPATIENT)
Dept: PHYSICAL THERAPY | Age: 56
Discharge: HOME | End: 2021-03-08
Attending: UROLOGY
Payer: COMMERCIAL

## 2021-03-08 PROCEDURE — 97110 THERAPEUTIC EXERCISES: CPT

## 2021-03-08 NOTE — PROGRESS NOTES
Alexandro Eulogio  : 1965  Primary: Malryn Wise Of [facility] sharita Cramer*  Secondary:  [address] at [address] [facility], [address] [place] [address]  [phone]   [phone]         OUTPATIENT PHYSICAL THERAPY: Daily Treatment Note 3/8/2021     ICD-10: Treatment Diagnosis: R27.8 Lack of coordination (muscle incoordination)  Precautions/Allergies:   Patient has no known allergies. TREATMENT PLAN:  Effective Dates: 2021 TO 2021 (90 days). Frequency/Duration: Follow up 3 weeks post-op, then resume PT 1 x weekly. Duration to be determined post-operatively. MEDICAL/REFERRING DIAGNOSIS:  Malignant neoplasm of prostate ([facility] [address] [C61]   DATE OF ONSET: -  REFERRING PHYSICIAN: Chandrika Velásquez DO  MD Orders: evaluate and treat  Return MD Appointment:3-8-21       Pre-treatment Symptoms/Complaints:  Patient reports he hasn't had a bowel movement since Friday. Had one this morning. He has to go back tomorrow to the MD b/c he has some blood in his urine. numbness went away. Still has burning with urination. Pain: Initial:   0 (burning) Post Session:  0/10   Medications Last Reviewed:  3/8/2021  Updated Objective Findings:    Urinary: Frequency 5-8 x/day, 2-3 x/night. Positive for stress urinary incontinence, dysuria, nocturia  . Negative for urge urinary incontinence, urinary urgency, urinary frequency, incomplete emptying, urinary hesitancy/intermittency, hematuria, and nocturnal enuresis. Pt does use pads for protection; 1 pads per day (PPD). Fluid intake: 6 bottle water/day; bladder irritants include: coffee (2 cups),  hot tea  Bowel: Frequency twice a day. Positive for none. Negative for pain with bowel movement, pushing/straining with bowel movement, fecal incontinence, constipation and incomplete emptying. Pt does not use pads for protection; 0 pads per day (PPD). Use of stool softeners or laxatives? YES  Sexual: Pt is sexually active with female partners. negative for dyspareunia. History of sexual abuse:  NO:          Pelvic Pain: none    External Observation:   · Voluntary Contraction: present  · Voluntary Relaxation: present        Pelvic Floor Muscle (PFM) Assessment: external palpation  · Muscle volume: []? decreased     [x]? WNL     []? Defect  · PFM tension: []? decreased     []? increased     [x]? WNL     Contraction ability (MMT performed via external palpation):  Voluntary contraction: []? absent     []? weak     [x]? moderate      []? strong  Voluntary relaxation: []? absent     []? partial     [x]? complete   Overflow: []? absent     [x]? min     []? mod     []? severe / Compensatory mm groups include abdominals   TREATMENT:   THERAPEUTIC EXERCISE: (55 minutes):  Exercises per grid below to improve mobility, strength, and coordination. Required minimal visual, verbal, and manual cues to promote proper body alignment, promote proper body posture, promote proper body mechanics, and promote proper body breathing techniques. Progressed resistance, range, repetitions, and complexity of movement as indicated.    Date:  2-1-21 Date:  3-1-21 Date:  3-8-21   Activity/Exercise Parameters Parameters Parameters   Kegel Exercises Supine, seated, standing  Reviewed     Bladder/bowel health Bladder health, toilet positioning      2nd position bridge  x20   x20   3rd position bridge   x20 green band     clamshells  x20 B green band x20 B green band   Seated March  x20 B green band x20 B green band   Sit to Stand  x20 Green band   x20 Green band   Quadruped leg extension  x20 B   x20 B   Rows        Extensions     x20 blue band   Chops     x20 blue band all directions   sidestepping   6 laps green band           Treatment/Session Summary:    · Response to Treatment:  Pt progressing with there ex  · Communication/Consultation:  None today  · Equipment provided today:  None today  · Recommendations/Intent for next treatment session: Next visit will focus on update subjective, biofeedback, kegel + movement.   Total Treatment Billable Duration:  55 minutes there ex  PT Patient Time In/Time Out  Time In: 0900  Time Out: 1000 Th [facility], DPT    Future Appointments   Date Time Provider Kin Hugo   3/9/2021  8:15 AM [address] [facility]  [address]   3/15/2021  9:00 AM Saud Mckinney DPT [facility]   3/22/2021  9:00 AM Saud Mckinney DPT [facility]   3/29/2021  9:00 AM Saud Mckinney DPT [facility]   4/5/2021  9:00 AM Saud Mckinney DPT [facility]   5/27/2021 12:00 PM [id number] BLOOD DRAW [id number] PGU   6/3/2021  2:00 PM Harsh Ramirez,  [id number] PGU

## 2021-03-15 ENCOUNTER — HOSPITAL ENCOUNTER (OUTPATIENT)
Dept: PHYSICAL THERAPY | Age: 56
Discharge: HOME | End: 2021-03-15
Attending: UROLOGY
Payer: COMMERCIAL

## 2021-03-15 PROCEDURE — 97110 THERAPEUTIC EXERCISES: CPT

## 2021-03-15 NOTE — PROGRESS NOTES
: 1965  Primary: Mario Alberto Z[facility] Of [facility]*  Secondary:  [address] at [address]  [phone]   [phone]         OUTPATIENT PHYSICAL THERAPY: Daily Treatment Note 3/15/2021     ICD-10: Treatment Diagnosis: R27.8 Lack of coordination (muscle incoordination)  Precautions/Allergies:   Patient has no known allergies. TREATMENT PLAN:  Effective Dates: 2021 TO 2021 (90 days). Frequency/Duration: Follow up 3 weeks post-op, then resume PT 1 x weekly. Duration to be determined post-operatively. MEDICAL/REFERRING DIAGNOSIS:  Malignant neoplasm of prostate ([facility] [address] [C61]   DATE OF ONSET: -  REFERRING PHYSICIAN: Judah Magallanes DO MD Orders: evaluate and treat  Return MD Appointment:3-8-21       Pre-treatment Symptoms/Complaints:  Patient reports still has some numbness. He takes the ibuprofen which helps a little bit. When he passes urine it's not burning as much. Has leakage when he passes gas. He has stopped the colace and is able to have bowel movements. Pain: Initial:   0  Post Session:  0/10   Medications Last Reviewed:  3/15/2021  Updated Objective Findings:    Urinary: Frequency 5-8 x/day, 2-3 x/night. Positive for stress urinary incontinence, dysuria, nocturia  . Negative for urge urinary incontinence, urinary urgency, urinary frequency, incomplete emptying, urinary hesitancy/intermittency, hematuria, and nocturnal enuresis. Pt does use pads for protection; 1 pads per day (PPD). Fluid intake: 6 bottle water/day; bladder irritants include: coffee (2 cups),  hot tea  Bowel: Frequency twice a day. Positive for none. Negative for pain with bowel movement, pushing/straining with bowel movement, fecal incontinence, constipation and incomplete emptying. Pt does not use pads for protection; 0 pads per day (PPD). Use of stool softeners or laxatives?  YES  Sexual: Pt is sexually active with female partners. negative for dyspareunia. History of sexual abuse:  NO:          Pelvic Pain: none    External Observation:   · Voluntary Contraction: present  · Voluntary Relaxation: present        Pelvic Floor Muscle (PFM) Assessment: external palpation  · Muscle volume: []? decreased     [x]? WNL     []? Defect  · PFM tension: []? decreased     []? increased     [x]? WNL     Contraction ability (MMT performed via external palpation):  Voluntary contraction: []? absent     []? weak     [x]? moderate      []? strong  Voluntary relaxation: []? absent     []? partial     [x]? complete   Overflow: []? absent     [x]? min     []? mod     []? severe / Compensatory mm groups include abdominals   TREATMENT:   THERAPEUTIC EXERCISE: (55 minutes):  Exercises per grid below to improve mobility, strength, and coordination. Required minimal visual, verbal, and manual cues to promote proper body alignment, promote proper body posture, promote proper body mechanics, and promote proper body breathing techniques. Progressed resistance, range, repetitions, and complexity of movement as indicated.    Date:  2-1-21 Date:  3-1-21 Date:  3-8-21 Date:  3-15-21   Activity/Exercise Parameters Parameters Parameters    Kegel Exercises Supine, seated, standing  Reviewed      Bladder/bowel health Bladder health, toilet positioning       2nd position bridge  x20   x20    3rd position bridge   x20 green band   x20 green band   Sidelying abduction      x20 silver band   Fire hyrdants    x20 silver band     clamshells  x20 B green band x20 B green band    Seated March  x20 B green band x20 B green band    Sit to Stand  x20 Green band   x20 Green band x20 with 6# to press   Quadruped alt arm leg     x20 B     Quadruped leg extension  x20 B   x20 B    Rows      x20 green band   Extensions     x20 blue band x20 green band   Chops     x20 blue band all directions x20 green band all directions   sidestepping   6 laps green band 4 laps silver band theraband walkouts    x20 green band both directions     Treatment/Session Summary:    · Response to Treatment:  Pt progressing with there ex. Continues to have some leakage and burning but symptoms are improving  · Communication/Consultation:  None today  · Equipment provided today:  None today  · Recommendations/Intent for next treatment session: Next visit will focus on update subjective, biofeedback, kegel + movement.   Total Treatment Billable Duration:  55 minutes there ex  PT Patient Time In/Time Out  Time In: 0900  Time Out: 1000 [address] [place] [address], T    Future Appointments   Date Time Provider Kin Hugo   3/22/2021  9:00 AM Xavier Roldan DPT [facility]   3/29/2021  9:00 AM Xavier Roldan DPT [facility]   4/5/2021  9:00 AM Xavier Roldan DPT [facility]   5/27/2021 12:00 PM [id number] BLOOD DRAW [id number] PGU   6/3/2021  2:00 PM Bree Ramirez DO [id number] PGU

## 2021-03-29 ENCOUNTER — HOSPITAL ENCOUNTER (OUTPATIENT)
Dept: PHYSICAL THERAPY | Age: 56
Discharge: HOME | End: 2021-03-29
Attending: UROLOGY
Payer: COMMERCIAL

## 2021-03-29 PROCEDURE — 97110 THERAPEUTIC EXERCISES: CPT

## 2021-03-29 NOTE — PROGRESS NOTES
Katherine Pantoja  : 1965  Primary: [facility]  Secondary:  [address] at [address]  [phone]   [phone]         OUTPATIENT PHYSICAL THERAPY: Daily Treatment Note 3/29/2021     ICD-10: Treatment Diagnosis: R27.8 Lack of coordination (muscle incoordination)  Precautions/Allergies:   Patient has no known allergies. TREATMENT PLAN:  Effective Dates: 2021 TO 2021 (90 days). Frequency/Duration: Follow up 3 weeks post-op, then resume PT 1 x weekly. Duration to be determined post-operatively. MEDICAL/REFERRING DIAGNOSIS:  Malignant neoplasm of prostate ([facility] [address] [C61]   DATE OF ONSET: -  REFERRING PHYSICIAN: Dillon Garcia DO MD Orders: evaluate and treat  Return MD Appointment:2021       Pre-treatment Symptoms/Complaints:  Patient reports he went back to work. Leakage is at a minimum. It happens when he bends over. Its not as bad. The numbness feeling has gone. He is sleeping better. Pain: Initial:   0  Post Session:  0/10   Medications Last Reviewed:  3/29/2021  Updated Objective Findings:    Urinary: Frequency 5-8 x/day, 2-3 x/night. Positive for stress urinary incontinence, dysuria, nocturia  . Negative for urge urinary incontinence, urinary urgency, urinary frequency, incomplete emptying, urinary hesitancy/intermittency, hematuria, and nocturnal enuresis. Pt does use pads for protection; 1 pads per day (PPD). Fluid intake: 6 bottle water/day; bladder irritants include: coffee (2 cups),  hot tea  Bowel: Frequency twice a day. Positive for none. Negative for pain with bowel movement, pushing/straining with bowel movement, fecal incontinence, constipation and incomplete emptying. Pt does not use pads for protection; 0 pads per day (PPD). Use of stool softeners or laxatives? YES  Sexual: Pt is sexually active with female partners. negative for dyspareunia.  History of sexual abuse: NO:          Pelvic Pain: none    External Observation:   · Voluntary Contraction: present  · Voluntary Relaxation: present        Pelvic Floor Muscle (PFM) Assessment: external palpation  · Muscle volume: []? decreased     [x]? WNL     []? Defect  · PFM tension: []? decreased     []? increased     [x]? WNL     Contraction ability (MMT performed via external palpation):  Voluntary contraction: []? absent     []? weak     [x]? moderate      []? strong  Voluntary relaxation: []? absent     []? partial     [x]? complete   Overflow: []? absent     [x]? min     []? mod     []? severe / Compensatory mm groups include abdominals   TREATMENT:   THERAPEUTIC EXERCISE: (55 minutes):  Exercises per grid below to improve mobility, strength, and coordination. Required minimal visual, verbal, and manual cues to promote proper body alignment, promote proper body posture, promote proper body mechanics, and promote proper body breathing techniques. Progressed resistance, range, repetitions, and complexity of movement as indicated.    Date:  2-1-21 Date:  3-1-21 Date:  3-8-21 Date:  3-15-21 Date:  3-29-21   Activity/Exercise Parameters Parameters Parameters     Kegel Exercises Supine, seated, standing  Reviewed       Bladder/bowel health Bladder health, toilet positioning        2nd position bridge  x20   x20     3rd position bridge   x20 green band   x20 green band x30 silver band   Sidelying abduction      x20 silver band x30 silver band   Fire hyrdants    x20 silver band   x30 silver band   clamshells  x20 B green band x20 B green band     Seated March  x20 B green band x20 B green band     Sit to Stand  x20 Green band   x20 Green band x20 with 6# to press x30 10# to press   Quadruped alt arm leg     x20 B   x20 B   Quadruped leg extension  x20 B   x20 B     Rows      x20 green band    Extensions     x20 blue band x20 green band    Chops     x20 blue band all directions x20 green band all directions x20 green band all directions sidestepping   6 laps green band 4 laps silver band 4 laps silver band   theraband walkouts    x20 green band both directions x20 green band both directions   Step Ups     x30 forwards and sideways                             Treatment/Session Summary:    · Response to Treatment:  Pt was able to return to work without issues. The numbness has gone away and he is sleeping better. Encouraged to slowly start getting back into his workouts. · Communication/Consultation:  None today  · Equipment provided today:  None today  · Recommendations/Intent for next treatment session: Next visit will focus on update subjective, biofeedback, kegel + movement.   Total Treatment Billable Duration:  55 minutes there ex  PT Patient Time In/Time Out  Time In: 0900  Time Out: 1000 [address] [place] [address], DPT    Future Appointments   Date Time Provider Kin Hugo   4/5/2021  9:00 AM Eliza Valle DPT [facility]   5/27/2021 12:00 PM [id number] BLOOD DRAW [id number] PGU   6/3/2021  2:00 PM Juan F Ramirez,  [id number] PGU

## 2021-04-05 ENCOUNTER — HOSPITAL ENCOUNTER (OUTPATIENT)
Dept: PHYSICAL THERAPY | Age: 56
Discharge: HOME | End: 2021-04-05
Attending: UROLOGY
Payer: COMMERCIAL

## 2021-04-05 PROCEDURE — 97110 THERAPEUTIC EXERCISES: CPT

## 2021-04-05 NOTE — THERAPY DISCHARGE
Esther Sawant : 1965 Primary: [facility]* Secondary:  [facility] 
[address] [place] [address] [phone]   [phone] OUTPATIENT PHYSICAL THERAPY:Discharge 2021 ICD-10: Treatment Diagnosis: R27.8 Lack of coordination (muscle incoordination) Precautions/Allergies:  
Patient has no known allergies. MEDICAL/REFERRING DIAGNOSIS: 
Malignant neoplasm of prostate [C61] DATE OF ONSET: - 
REFERRING PHYSICIAN: Dalia Ramirez,  
MD Orders: evaluate and treat Return MD Appointment: 21 DISCHARGE SUMMARY: Mr. Rosemary Milliagn has been seen in skilled PT from 21 to 21. Patient has attended 6 out of 6 scheduled visits, with 0 cancellation(s) and 0 no shows. Treatment has emphasized kegels, bladder health, toilet mechanics and coordination of PFm. 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 Pt has achieved goals as indicated below and all questions have been answered to their satisfaction. Pt was invited to call with any further questions or concerns as needed. REASSESSMENT: Mr. Rosemary Milligan has been seen in skilled PT from 21 to 3-1-21. Patient has attended 2 out of 2 scheduled visits, with 0 cancellation(s) and 0 no shows. Treatment has emphasized initiation of kegels, bladder health, toilet mechanics. Patient  continues to demonstrate deficits in PFM coordination and strength contributing to urinary incontinence. Pt has progressed with goals as indicated below at this point and will continue to benefit from skilled PT in order to achieve remaining goals and maximize functional outcomes in order to return to [facility]. INITIAL ASSESSMENT:  Mr. Rosemary Milligan presents with decreased coordination, strength and endurance of pelvic floor muscle pre operatively.  Today he was educated on bladder health, kegel exercises, pelvic floor anatomy and role of musculature and precautions with catheter post operatively. He required verbal cuing for proper activation and isolation of pelvic floor muscles. He was given kegel exercises to do at home prior to surgery and once catheter is removed. He was educated on pain after surgery and precautions with kegel exercises. He was able to demonstrate improved coordination by the end of the session today. He will continue to benefit from skilled physical therapy to address above mentioned deficits and restore normal PFM function post operatively to minimize urinary incontinence GOALS: (Goals have been discussed and agreed upon with patient.) Short-Term Functional Goals: Time Frame: 4 weeks 1. Pt will demonstrate I with basic PFM HEP to improve awareness, coordination, and timing of PFM. MET 2. Pt will increase water intake by 16 oz and decrease irritant consumption by 8 oz to improve bladder health and decrease UI. MET Discharge Goals: Time Frame: 8 weeks 1. Pt will demonstrate 10 quick flicks of the pelvic floor muscle group, without compensation, to implement urge suppression appropriately with urgency of urination and decrease the number of pads used per day. MET 2. Pt will increase PFM endurance to 10 seconds without compensation to improve standing tolerance without UI to > 1 hour. MET 3. Pt will demonstrate I with advanced core stabilization and general mobility program to facilitate carry over and I management of symptoms. MET Urinary: Frequency 5-8 x/day, 0 x/night. Positive for stress urinary incontinence. Negative for nocturia, dysuria, urge urinary incontinence, urinary urgency, urinary frequency, incomplete emptying, urinary hesitancy/intermittency, hematuria, and nocturnal enuresis. Pt does not use pads for protection; 0 pads per day (PPD). Fluid intake: 6 bottle water/day; bladder irritants include: coffee (2 cups), hot tea Bowel: Frequency twice a day.  Positive for none. Negative for pain with bowel movement, pushing/straining with bowel movement, fecal incontinence, constipation and incomplete emptying. Pt does not use pads for protection; 0 pads per day (PPD). Use of stool softeners or laxatives? YES Sexual: Pt is sexually active with female partners. negative for dyspareunia. History of sexual abuse:  NO:  
       Pelvic Pain: none External Observation: · Voluntary Contraction: present · Voluntary Relaxation: present 
  
  
Pelvic Floor Muscle (PFM) Assessment: external palpation · Muscle volume: []? decreased     [x]? WNL     []? Defect · PFM tension: []? decreased     []? increased     [x]? WNL 
  
Contraction ability (MMT performed via external palpation): 
Voluntary contraction: []? absent     []? weak     [x]? moderate      [x]? strong Voluntary relaxation: []? absent     []? partial     [x]? complete Overflow: []? absent     []? min     []? mod     []? severe / Compensatory mm groups include Outcome Measure: Tool used: Expanded Prostate Cancer Index Composite for Clinical Practice (EPIC-CP) Score:  Initial:  Urinary Incontinence symptom score: 0/12  Urinary Irritation/Obstruction symptoms score: 0/12  Bowel symptoms score: 0/12  Sexual symptoms score: 0/12  Vitality/hormonal symptoms score: 0/12  Overall Prostate Cancer QOL score: 0/60 Most Recent: X (Date: 3-1-21 )  Urinary Incontinence symptom score: 4/12  Urinary Irritation/Obstruction symptoms score: 2/12  Bowel symptoms score: 0/12  Sexual symptoms score: 0/12  Vitality/hormonal symptoms score: 1/12  Overall Prostate Cancer QOL score: 7/60 Most Recent: X (Date: 4-5-21 )  Urinary Incontinence symptom score: 0/12  Urinary Irritation/Obstruction symptoms score: 1/12  Bowel symptoms score: 0/12  Sexual symptoms score: 0/12  Vitality/hormonal symptoms score: 0/12  Overall Prostate Cancer QOL score: 1/60 Interpretation of Score:   This survey asks questions concerning certain urinary, bowel and sexual symptoms. Each question is scored on a 0-4 scale, 4 representing the greatest disability. There are 5 sub-scores, each out of 12 and a total overall symptom score out of 60. The higher the score, suggests a higher disability. Total Duration: PT Patient Time In/Time Out Time In: 0900 Time Out: 4554 Josselin Panchal DPT

## 2021-04-05 NOTE — PROGRESS NOTES
Enmanuel San  : 1965  Primary: [facility]*  Secondary:  [address]  at [address]  [phone]   [phone]         OUTPATIENT PHYSICAL THERAPY: Daily Treatment Note 2021     ICD-10: Treatment Diagnosis: R27.8 Lack of coordination (muscle incoordination)  Precautions/Allergies:   Patient has no known allergies. MEDICAL/REFERRING DIAGNOSIS:  Malignant neoplasm of prostate ([facility] [address] [C61]   DATE OF ONSET: -  REFERRING PHYSICIAN: Yumiko Mcbride DO MD Orders: evaluate and treat  Return MD Appointment:2021       Pre-treatment Symptoms/Complaints:  Patient reports light leakage (cough)    Pain: Initial:   0  Post Session:  0/10   Medications Last Reviewed:  2021  Updated Objective Findings:    Urinary: Frequency 5-8 x/day, 0 x/night. Positive for stress urinary incontinence. Negative for nocturia, dysuria, urge urinary incontinence, urinary urgency, urinary frequency, incomplete emptying, urinary hesitancy/intermittency, hematuria, and nocturnal enuresis. Pt does not use pads for protection; 0 pads per day (PPD). Fluid intake: 6 bottle water/day; bladder irritants include: coffee (2 cups), hot tea  Bowel: Frequency twice a day. Positive for none. Negative for pain with bowel movement, pushing/straining with bowel movement, fecal incontinence, constipation and incomplete emptying. Pt does not use pads for protection; 0 pads per day (PPD). Use of stool softeners or laxatives? YES  Sexual: Pt is sexually active with female partners. negative for dyspareunia. History of sexual abuse:  NO:          Pelvic Pain: none    External Observation:   · Voluntary Contraction: present  · Voluntary Relaxation: present        Pelvic Floor Muscle (PFM) Assessment: external palpation  · Muscle volume: []? decreased     [x]? WNL     []? Defect  · PFM tension: []? decreased     []? increased     [x]? WNL     Contraction ability (MMT performed via external palpation):  Voluntary contraction: []? absent     []? weak     [x]? moderate      [x]? strong  Voluntary relaxation: []? absent     []? partial     [x]? complete   Overflow: []? absent     []? min     []? mod     []? severe / Compensatory mm groups include      TREATMENT:   THERAPEUTIC EXERCISE: (55 minutes):  Exercises per grid below to improve mobility, strength, and coordination. Required minimal visual, verbal, and manual cues to promote proper body alignment, promote proper body posture, promote proper body mechanics, and promote proper body breathing techniques. Progressed resistance, range, repetitions, and complexity of movement as indicated.    Date:  2-1-21 Date:  3-1-21 Date:  3-8-21 Date:  3-15-21 Date:  3-29-21 Date:  4-5-21   Activity/Exercise Parameters Parameters Parameters      Kegel Exercises Supine, seated, standing  Reviewed        Bladder/bowel health Bladder health, toilet positioning         2nd position bridge  x20   x20      3rd position bridge   x20 green band   x20 green band x30 silver band x30 silver band   Sidelying abduction      x20 silver band x30 silver band x30 silver band   Fire hyrdants    x20 silver band   x30 silver band x30 silver band   clamshells  x20 B green band x20 B green band      Seated March  x20 B green band x20 B green band      Sit to Stand  x20 Green band   x20 Green band x20 with 6# to press x30 10# to press x30 10# to press   Quadruped alt arm leg     x20 B   x20 B    Quadruped leg extension  x20 B   x20 B      Rows      x20 green band     Extensions     x20 blue band x20 green band     Chops     x20 blue band all directions x20 green band all directions x20 green band all directions x20 green band all directions   sidestepping   6 laps green band 4 laps silver band 4 laps silver band 4 laps silver band   theraband walkouts    x20 green band both directions x20 green band both directions x20 green band both directions   Step Ups     x30 forwards and sideways x30 forwards and sideways                                  Total Treatment Billable Duration:  55 minutes there ex  PT Patient Time In/Time Out  Time In: 0900  Time Out: 1000 [address] [place] [address], [facility]    Future Appointments   Date Time Provider Kin Hugo   5/27/2021 12:00 PM [id number] BLOOD DRAW [id number] PGU   6/3/2021  2:00 PM Maribel Ramirez,  [id number] PGU

## 2022-01-28 ENCOUNTER — HOSPITAL ENCOUNTER (OUTPATIENT)
Dept: LAB | Age: 57
Discharge: HOME | End: 2022-01-28

## 2022-01-28 LAB
ALBUMIN SERPL-MCNC: 4 G/DL (ref 3.5–5)
ALBUMIN/GLOB SERPL: 1 {RATIO} (ref 1.2–3.5)
ALP SERPL-CCNC: 71 U/L (ref 50–136)
ALT SERPL-CCNC: 36 U/L (ref 12–65)
ANION GAP SERPL CALC-SCNC: 3 MMOL/L (ref 7–16)
AST SERPL-CCNC: 23 U/L (ref 15–37)
BILIRUB SERPL-MCNC: 0.7 MG/DL (ref 0.2–1.1)
BUN SERPL-MCNC: 9 MG/DL (ref 6–23)
CALCIUM SERPL-MCNC: 9.5 MG/DL (ref 8.3–10.4)
CHLORIDE SERPL-SCNC: 106 MMOL/L (ref 98–107)
CO2 SERPL-SCNC: 32 MMOL/L (ref 21–32)
CREAT SERPL-MCNC: 0.96 MG/DL (ref 0.8–1.5)
ERYTHROCYTE [DISTWIDTH] IN BLOOD BY AUTOMATED COUNT: 11.8 % (ref 11.9–14.6)
GLOBULIN SER CALC-MCNC: 3.9 G/DL (ref 2.3–3.5)
GLUCOSE SERPL-MCNC: 73 MG/DL (ref 65–100)
HCT VFR BLD AUTO: 46.5 % (ref 41.1–50.3)
HGB BLD-MCNC: 16.2 G/DL (ref 13.6–17.2)
MCH RBC QN AUTO: 32 PG (ref 26.1–32.9)
MCHC RBC AUTO-ENTMCNC: 34.8 G/DL (ref 31.4–35)
MCV RBC AUTO: 91.7 FL (ref 79.6–97.8)
NRBC # BLD: 0 K/UL (ref 0–0.2)
PLATELET # BLD AUTO: 212 K/UL (ref 150–450)
PMV BLD AUTO: 11.8 FL (ref 9.4–12.3)
POTASSIUM SERPL-SCNC: 3.7 MMOL/L (ref 3.5–5.1)
PROT SERPL-MCNC: 7.9 G/DL (ref 6.3–8.2)
RBC # BLD AUTO: 5.07 M/UL (ref 4.23–5.6)
SODIUM SERPL-SCNC: 141 MMOL/L (ref 138–145)
WBC # BLD AUTO: 4.5 K/UL (ref 4.3–11.1)

## 2022-01-28 PROCEDURE — 80053 COMPREHEN METABOLIC PANEL: CPT

## 2022-01-28 PROCEDURE — 85027 COMPLETE CBC AUTOMATED: CPT

## 2022-01-28 PROCEDURE — 36415 COLL VENOUS BLD VENIPUNCTURE: CPT

## 2022-03-20 PROBLEM — C61 PROSTATE CANCER (HCC): Status: ACTIVE | Noted: 2021-02-08

## 2022-07-28 ENCOUNTER — OFFICE VISIT (OUTPATIENT)
Dept: FAMILY MEDICINE CLINIC | Facility: CLINIC | Age: 57
End: 2022-07-28

## 2022-07-28 VITALS
BODY MASS INDEX: 23.25 KG/M2 | DIASTOLIC BLOOD PRESSURE: 80 MMHG | TEMPERATURE: 98.5 F | SYSTOLIC BLOOD PRESSURE: 130 MMHG | HEART RATE: 50 BPM | HEIGHT: 70 IN | WEIGHT: 162.4 LBS | OXYGEN SATURATION: 98 %

## 2022-07-28 DIAGNOSIS — Z12.11 ENCOUNTER FOR SCREENING COLONOSCOPY: ICD-10-CM

## 2022-07-28 DIAGNOSIS — I10 ESSENTIAL HYPERTENSION WITH GOAL BLOOD PRESSURE LESS THAN 140/90: Primary | ICD-10-CM

## 2022-07-28 DIAGNOSIS — Z13.220 ENCOUNTER FOR SCREENING FOR LIPID DISORDER: ICD-10-CM

## 2022-07-28 DIAGNOSIS — J30.89 ENVIRONMENTAL AND SEASONAL ALLERGIES: ICD-10-CM

## 2022-07-28 DIAGNOSIS — Z79.899 MEDICATION MANAGEMENT: ICD-10-CM

## 2022-07-28 DIAGNOSIS — C61 PROSTATE CANCER (HCC): ICD-10-CM

## 2022-07-28 LAB
ALBUMIN SERPL-MCNC: 3.9 G/DL (ref 3.5–5)
ALBUMIN/GLOB SERPL: 1 {RATIO} (ref 1.2–3.5)
ALP SERPL-CCNC: 67 U/L (ref 50–136)
ALT SERPL-CCNC: 24 U/L (ref 12–65)
ANION GAP SERPL CALC-SCNC: 3 MMOL/L (ref 7–16)
AST SERPL-CCNC: 19 U/L (ref 15–37)
BILIRUB SERPL-MCNC: 0.6 MG/DL (ref 0.2–1.1)
BUN SERPL-MCNC: 12 MG/DL (ref 6–23)
CALCIUM SERPL-MCNC: 8.8 MG/DL (ref 8.3–10.4)
CHLORIDE SERPL-SCNC: 108 MMOL/L (ref 98–107)
CHOLEST SERPL-MCNC: 123 MG/DL
CO2 SERPL-SCNC: 30 MMOL/L (ref 21–32)
CREAT SERPL-MCNC: 1.1 MG/DL (ref 0.8–1.5)
ERYTHROCYTE [DISTWIDTH] IN BLOOD BY AUTOMATED COUNT: 12.6 % (ref 11.9–14.6)
GLOBULIN SER CALC-MCNC: 3.8 G/DL (ref 2.3–3.5)
GLUCOSE SERPL-MCNC: 82 MG/DL (ref 65–100)
HCT VFR BLD AUTO: 42 % (ref 41.1–50.3)
HDLC SERPL-MCNC: 56 MG/DL (ref 40–60)
HDLC SERPL: 2.2 {RATIO}
HGB BLD-MCNC: 14.4 G/DL (ref 13.6–17.2)
LDLC SERPL CALC-MCNC: 54.4 MG/DL
MCH RBC QN AUTO: 32.8 PG (ref 26.1–32.9)
MCHC RBC AUTO-ENTMCNC: 34.3 G/DL (ref 31.4–35)
MCV RBC AUTO: 95.7 FL (ref 79.6–97.8)
NRBC # BLD: 0 K/UL (ref 0–0.2)
PLATELET # BLD AUTO: 156 K/UL (ref 150–450)
PMV BLD AUTO: 12.2 FL (ref 9.4–12.3)
POTASSIUM SERPL-SCNC: 3.8 MMOL/L (ref 3.5–5.1)
PROT SERPL-MCNC: 7.7 G/DL (ref 6.3–8.2)
RBC # BLD AUTO: 4.39 M/UL (ref 4.23–5.6)
SODIUM SERPL-SCNC: 141 MMOL/L (ref 138–145)
TRIGL SERPL-MCNC: 63 MG/DL (ref 35–150)
VLDLC SERPL CALC-MCNC: 12.6 MG/DL (ref 6–23)
WBC # BLD AUTO: 3.4 K/UL (ref 4.3–11.1)

## 2022-07-28 PROCEDURE — 99213 OFFICE O/P EST LOW 20 MIN: CPT | Performed by: NURSE PRACTITIONER

## 2022-07-28 RX ORDER — CETIRIZINE HYDROCHLORIDE 10 MG/1
10 TABLET ORAL DAILY PRN
Qty: 30 TABLET | Refills: 11 | Status: SHIPPED | OUTPATIENT
Start: 2022-07-28

## 2022-07-28 RX ORDER — ALBUTEROL SULFATE 90 UG/1
1 AEROSOL, METERED RESPIRATORY (INHALATION) EVERY 6 HOURS PRN
Qty: 18 G | Refills: 2 | Status: SHIPPED | OUTPATIENT
Start: 2022-07-28

## 2022-07-28 RX ORDER — LOSARTAN POTASSIUM 50 MG/1
50 TABLET ORAL DAILY
Qty: 30 TABLET | Refills: 5 | Status: SHIPPED | OUTPATIENT
Start: 2022-07-28

## 2022-07-28 SDOH — ECONOMIC STABILITY: TRANSPORTATION INSECURITY
IN THE PAST 12 MONTHS, HAS THE LACK OF TRANSPORTATION KEPT YOU FROM MEDICAL APPOINTMENTS OR FROM GETTING MEDICATIONS?: NO

## 2022-07-28 SDOH — ECONOMIC STABILITY: TRANSPORTATION INSECURITY
IN THE PAST 12 MONTHS, HAS LACK OF TRANSPORTATION KEPT YOU FROM MEETINGS, WORK, OR FROM GETTING THINGS NEEDED FOR DAILY LIVING?: NO

## 2022-07-28 SDOH — ECONOMIC STABILITY: FOOD INSECURITY: WITHIN THE PAST 12 MONTHS, YOU WORRIED THAT YOUR FOOD WOULD RUN OUT BEFORE YOU GOT MONEY TO BUY MORE.: NEVER TRUE

## 2022-07-28 SDOH — ECONOMIC STABILITY: FOOD INSECURITY: WITHIN THE PAST 12 MONTHS, THE FOOD YOU BOUGHT JUST DIDN'T LAST AND YOU DIDN'T HAVE MONEY TO GET MORE.: NEVER TRUE

## 2022-07-28 ASSESSMENT — ENCOUNTER SYMPTOMS
TROUBLE SWALLOWING: 0
WHEEZING: 0
SHORTNESS OF BREATH: 0
DIARRHEA: 0
COUGH: 1
NAUSEA: 0
CONSTIPATION: 0
EYES NEGATIVE: 1
BLOOD IN STOOL: 0
VOMITING: 0
ABDOMINAL PAIN: 1
RHINORRHEA: 1
SINUS PAIN: 0

## 2022-07-28 ASSESSMENT — LIFESTYLE VARIABLES
HOW MANY STANDARD DRINKS CONTAINING ALCOHOL DO YOU HAVE ON A TYPICAL DAY: 1 OR 2
HOW OFTEN DO YOU HAVE A DRINK CONTAINING ALCOHOL: MONTHLY OR LESS

## 2022-07-28 ASSESSMENT — PATIENT HEALTH QUESTIONNAIRE - PHQ9
SUM OF ALL RESPONSES TO PHQ QUESTIONS 1-9: 0
SUM OF ALL RESPONSES TO PHQ QUESTIONS 1-9: 0
1. LITTLE INTEREST OR PLEASURE IN DOING THINGS: 0
SUM OF ALL RESPONSES TO PHQ9 QUESTIONS 1 & 2: 0
SUM OF ALL RESPONSES TO PHQ QUESTIONS 1-9: 0
2. FEELING DOWN, DEPRESSED OR HOPELESS: 0
SUM OF ALL RESPONSES TO PHQ QUESTIONS 1-9: 0

## 2022-07-28 ASSESSMENT — SOCIAL DETERMINANTS OF HEALTH (SDOH): HOW HARD IS IT FOR YOU TO PAY FOR THE VERY BASICS LIKE FOOD, HOUSING, MEDICAL CARE, AND HEATING?: SOMEWHAT HARD

## 2022-07-28 NOTE — PROGRESS NOTES
Dora Bishop is a 55 y.o. male who presents today for the following:  Chief Complaint   Patient presents with    Follow-up     6 month fu       No Known Allergies    Current Outpatient Medications   Medication Sig Dispense Refill    cetirizine (ZYRTEC) 10 MG tablet Take 1 tablet by mouth daily as needed for Allergies or Rhinitis 30 tablet 11    losartan (COZAAR) 50 MG tablet Take 1 tablet by mouth in the morning. TAKE 1 TABLET BY MOUTH EVERY DAY Indications: high blood pressure. 30 tablet 5    albuterol sulfate HFA (PROVENTIL;VENTOLIN;PROAIR) 108 (90 Base) MCG/ACT inhaler Inhale 1 puff into the lungs every 6 hours as needed for Wheezing or Shortness of Breath 18 g 2    sildenafil (VIAGRA) 100 MG tablet Take 100 mg by mouth daily as needed (Patient not taking: Reported on 7/28/2022)       No current facility-administered medications for this visit. Past Medical History:   Diagnosis Date    Environmental and seasonal allergies 3/29/2016    Essential hypertension with goal blood pressure less than 140/90 9/30/2016    Prostate cancer [facility])        Past Surgical History:   Procedure Laterality Date    COLONOSCOPY N/A 1/27/2017    COLONOSCOPY/ 24 performed by Boris Herron MD at [facility] ENDOSCOPY    COLONOSCOPY FLX DX W/COLLJ [facility] WHEN PFRMD  1/27/2017         CYST REMOVAL Right     wrist    PROSTATECTOMY  02/04/2021    SHOULDER ARTHROSCOPY Left             Social History     Tobacco Use    Smoking status: Never    Smokeless tobacco: Never   Substance Use Topics    Alcohol use: Yes     Alcohol/week: 0.0 standard drinks        Family History   Problem Relation Age of Onset    No Known Problems Brother     Suicide Brother     No Known Problems Brother     No Known Problems Brother     No Known Problems Sister     Alcohol Abuse Father     Other Father         alcoholism    Other Mother         hemorrhage       HPI   Pt presents for follow up conditions listed.   He has hx of HTN, environmental/seasonal allergies, and prostate cancer. He is followed by urology. His last colonoscopy was 01/27/2017 overdue since January. Per notes of report, no specimens, but due to poor prep recommend 5 year follow up. Pt reports seasonal allergies are flared up. He is playing soccer tonight and stays active. Review of Systems   Constitutional:  Negative for chills, fatigue and fever. HENT:  Positive for rhinorrhea and sneezing. Negative for congestion, sinus pain and trouble swallowing. Itching throat. Eyes: Negative. Wears glasses, no contacts. Respiratory:  Positive for cough. Negative for shortness of breath and wheezing. Cardiovascular:  Negative for chest pain, palpitations and leg swelling. Gastrointestinal:  Positive for abdominal pain. Negative for blood in stool, constipation, diarrhea, nausea and vomiting. Hernia, was referred by urology to surgery. Pain only with coughing, reducible. Genitourinary:  Negative for decreased urine volume and difficulty urinating. Musculoskeletal:  Negative for arthralgias and myalgias. Skin: Negative. Allergic/Immunologic: Positive for environmental allergies. Neurological:  Positive for dizziness and headaches. From drainage. Psychiatric/Behavioral:  Negative for dysphoric mood and sleep disturbance. The patient is not nervous/anxious. /80 (Site: Right Upper Arm, Position: Sitting, Cuff Size: Medium Adult)   Pulse 50   Temp 98.5 °F (36.9 °C) (Oral)   Ht 5' 10\" (1.778 m)   Wt 162 lb 6.4 oz (73.7 kg)   SpO2 98%   BMI 23.30 kg/m²     Physical Exam  Constitutional:       General: He is not in acute distress. Appearance: Normal appearance. He is normal weight. He is not ill-appearing. HENT:      Head: Normocephalic and atraumatic.       Right Ear: Tympanic membrane, ear canal and external ear normal.      Left Ear: Tympanic membrane, ear canal and external ear normal.   Eyes:      Extraocular Movements: Extraocular movements intact. Conjunctiva/sclera: Conjunctivae normal.      Pupils: Pupils are equal, round, and reactive to light. Cardiovascular:      Rate and Rhythm: Normal rate and regular rhythm. Pulses: Normal pulses. Heart sounds: Normal heart sounds. Pulmonary:      Effort: Pulmonary effort is normal.      Breath sounds: Normal breath sounds. Abdominal:      General: Bowel sounds are normal. There is no distension. Palpations: Abdomen is soft. There is no mass. Tenderness: There is no abdominal tenderness. Hernia: No hernia is present. Musculoskeletal:         General: Normal range of motion. Cervical back: Normal range of motion and neck supple. No rigidity or tenderness. Right lower leg: No edema. Left lower leg: No edema. Lymphadenopathy:      Cervical: No cervical adenopathy. Skin:     General: Skin is warm and dry. Coloration: Skin is not jaundiced or pale. Neurological:      General: No focal deficit present. Mental Status: He is alert and oriented to person, place, and time. Psychiatric:         Mood and Affect: Mood normal.         Behavior: Behavior normal.         Thought Content: Thought content normal.         Judgment: Judgment normal.        1. Essential hypertension with goal blood pressure less than 140/90  Assessment & Plan:   Well-controlled, continue current medications and continue current treatment plan  Orders:  -     Comprehensive Metabolic Panel; Future  -     Lipid Panel; Future  -     losartan (COZAAR) 50 MG tablet; Take 1 tablet by mouth in the morning. TAKE 1 TABLET BY MOUTH EVERY DAY Indications: high blood pressure., Disp-30 tablet, R-5Normal  2. Prostate cancer ([facility] [address]  -     CBC; Future  3.  Environmental and seasonal allergies  Assessment & Plan:   Borderline controlled, continue current medications, continue current treatment plan, medication adherence emphasized and recommended saline spray am and OTC Flonase spray prn  Orders:  -     CBC; Future  -     cetirizine (ZYRTEC) 10 MG tablet; Take 1 tablet by mouth daily as needed for Allergies or Rhinitis, Disp-30 tablet, R-11Normal  -     albuterol sulfate HFA (PROVENTIL;VENTOLIN;PROAIR) 108 (90 Base) MCG/ACT inhaler; Inhale 1 puff into the lungs every 6 hours as needed for Wheezing or Shortness of Breath, Disp-18 g, R-2Normal  4. Medication management  -     Comprehensive Metabolic Panel; Future  -     CBC; Future  5. Encounter for screening for lipid disorder  -     Lipid Panel; Future  6. Encounter for screening colonoscopy  -     [address] Located within [facility]     Patient informed, we will call with blood work results within one week. If you have not heard regarding results in over a week, please contact office. You can also review results on FanXTt. Plan Follow up in 6 months. Abel Contreras NP

## 2022-07-28 NOTE — ASSESSMENT & PLAN NOTE
Borderline controlled, continue current medications, continue current treatment plan, medication adherence emphasized and recommended saline spray am and OTC Flonase spray prn

## 2022-08-10 ENCOUNTER — OFFICE VISIT (OUTPATIENT)
Dept: GASTROENTEROLOGY | Age: 57
End: 2022-08-10

## 2022-08-10 VITALS
DIASTOLIC BLOOD PRESSURE: 86 MMHG | SYSTOLIC BLOOD PRESSURE: 155 MMHG | BODY MASS INDEX: 24.2 KG/M2 | WEIGHT: 169 LBS | TEMPERATURE: 98.1 F | OXYGEN SATURATION: 97 % | HEIGHT: 70 IN | HEART RATE: 59 BPM

## 2022-08-10 DIAGNOSIS — Z12.11 ENCOUNTER FOR SCREENING COLONOSCOPY: Primary | ICD-10-CM

## 2022-08-10 PROCEDURE — 45378 DIAGNOSTIC COLONOSCOPY: CPT | Performed by: INTERNAL MEDICINE

## 2022-08-10 PROCEDURE — 99202 OFFICE O/P NEW SF 15 MIN: CPT | Performed by: INTERNAL MEDICINE

## 2022-08-10 RX ORDER — POLYETHYLENE GLYCOL 3350, SODIUM SULFATE ANHYDROUS, SODIUM BICARBONATE, SODIUM CHLORIDE, POTASSIUM CHLORIDE 236; 22.74; 6.74; 5.86; 2.97 G/4L; G/4L; G/4L; G/4L; G/4L
4 POWDER, FOR SOLUTION ORAL ONCE
Qty: 4000 ML | Refills: 0 | Status: SHIPPED | OUTPATIENT
Start: 2022-08-10 | End: 2022-08-10

## 2022-08-10 ASSESSMENT — ENCOUNTER SYMPTOMS
BLOOD IN STOOL: 0
ABDOMINAL PAIN: 0
TROUBLE SWALLOWING: 0
SHORTNESS OF BREATH: 0
COLOR CHANGE: 0
VOMITING: 0

## 2022-08-10 NOTE — PROGRESS NOTES
Corine Hewitt (:  1965) is a 55 y.o. male, new patient here for evaluation of the following chief complaint(s):  Colonoscopy         ASSESSMENT/PLAN:  1. Encounter for screening colonoscopy  -     VT COLONOSCOPY FLX DX W/COLLJ SPEC WHEN PFRMD  -     polyethylene glycol (GOLYTELY) 236 g solution; Take 4,000 mLs by mouth once for 1 dose, Disp-4000 mL, R-0Normal           Subjective   SUBJECTIVE/OBJECTIVE  Need arrange screening colonoscopy. Denied any rectal bleeding or change in bowel habits. No family history of colon cancer. Patient is status post robotic surgery for prostate cancer. Past Medical History:   Diagnosis Date    Environmental and seasonal allergies 3/29/2016    Essential hypertension with goal blood pressure less than 140/90 2016    Prostate cancer [facility])        Past Surgical History:   Procedure Laterality Date    COLONOSCOPY N/A 2017    COLONOSCOPY/ 24 performed by Jace Patterson MD at [facility] ENDOSCOPY    COLONOSCOPY FLX DX W/COLLJ [facility]IENT [facility] WHEN PFRMD  2017         CYST REMOVAL Right     wrist    PROSTATECTOMY  2021    SHOULDER ARTHROSCOPY Left                   No Known Allergies       Review of Systems   Constitutional:  Negative for appetite change. HENT:  Negative for mouth sores and trouble swallowing. Respiratory:  Negative for shortness of breath. Cardiovascular:  Negative for chest pain. Gastrointestinal:  Negative for abdominal pain, blood in stool and vomiting. Skin:  Negative for color change. Allergic/Immunologic: Negative for food allergies. Neurological:  Negative for seizures and weakness. Hematological:  Does not bruise/bleed easily. Objective   Physical Exam  HENT:      Head: Normocephalic. Mouth/Throat:      Mouth: Mucous membranes are moist.   Eyes:      General: No scleral icterus. Cardiovascular:      Rate and Rhythm: Normal rate and regular rhythm. Pulmonary:      Effort: No respiratory distress. Abdominal:      General: There is no distension. Tenderness: There is no abdominal tenderness. There is no rebound. Lymphadenopathy:      Cervical: No cervical adenopathy. Skin:     Coloration: Skin is not jaundiced. Findings: No bruising. Neurological:      General: No focal deficit present. Mental Status: He is alert. Current Outpatient Medications   Medication Sig Dispense Refill    polyethylene glycol (GOLYTELY) 236 g solution Take 4,000 mLs by mouth once for 1 dose 4000 mL 0    cetirizine (ZYRTEC) 10 MG tablet Take 1 tablet by mouth daily as needed for Allergies or Rhinitis 30 tablet 11    losartan (COZAAR) 50 MG tablet Take 1 tablet by mouth in the morning. TAKE 1 TABLET BY MOUTH EVERY DAY Indications: high blood pressure. 30 tablet 5    albuterol sulfate HFA (PROVENTIL;VENTOLIN;PROAIR) 108 (90 Base) MCG/ACT inhaler Inhale 1 puff into the lungs every 6 hours as needed for Wheezing or Shortness of Breath 18 g 2    sildenafil (VIAGRA) 100 MG tablet Take 100 mg by mouth daily as needed (Patient not taking: No sig reported)       No current facility-administered medications for this visit. Family History   Problem Relation Age of Onset    No Known Problems Brother     Suicide Brother     No Known Problems Brother     No Known Problems Brother     No Known Problems Sister     Alcohol Abuse Father     Other Father         alcoholism    Other Mother         hemorrhage       No follow-ups on file. An electronic signature was used to authenticate this note.     --Liyah Dawson MD

## 2022-08-11 ENCOUNTER — TELEPHONE (OUTPATIENT)
Dept: GASTROENTEROLOGY | Age: 57
End: 2022-08-11

## 2022-08-11 NOTE — TELEPHONE ENCOUNTER
Called and left patient a VM stating the Dr. Tawana Carrasco would like to do his colonoscopy sooner than later due to previous Prostate issues. Gave patient the financial service number to help with any assistance needed.    TL

## 2022-10-03 ENCOUNTER — NURSE ONLY (OUTPATIENT)
Dept: UROLOGY | Age: 57
End: 2022-10-03

## 2022-10-03 DIAGNOSIS — C61 MALIGNANT NEOPLASM OF PROSTATE (HCC): ICD-10-CM

## 2022-10-03 DIAGNOSIS — C61 MALIGNANT NEOPLASM OF PROSTATE (HCC): Primary | ICD-10-CM

## 2022-10-05 LAB — PSA SERPL DL<=0.01 NG/ML-MCNC: 0.01 NG/ML (ref 0–4)

## 2022-10-10 ENCOUNTER — OFFICE VISIT (OUTPATIENT)
Dept: UROLOGY | Age: 57
End: 2022-10-10
Payer: COMMERCIAL

## 2022-10-10 DIAGNOSIS — C61 MALIGNANT NEOPLASM OF PROSTATE (HCC): Primary | ICD-10-CM

## 2022-10-10 DIAGNOSIS — K40.90 NON-RECURRENT UNILATERAL INGUINAL HERNIA WITHOUT OBSTRUCTION OR GANGRENE: ICD-10-CM

## 2022-10-10 LAB
BILIRUBIN, URINE, POC: NEGATIVE
BLOOD URINE, POC: NEGATIVE
GLUCOSE URINE, POC: NEGATIVE
KETONES, URINE, POC: NEGATIVE
LEUKOCYTE ESTERASE, URINE, POC: NEGATIVE
NITRITE, URINE, POC: NEGATIVE
PH, URINE, POC: 6 (ref 4.6–8)
PROTEIN,URINE, POC: NEGATIVE
SPECIFIC GRAVITY, URINE, POC: 1.02 (ref 1–1.03)
URINALYSIS CLARITY, POC: NORMAL
URINALYSIS COLOR, POC: NORMAL
UROBILINOGEN, POC: NORMAL

## 2022-10-10 PROCEDURE — 99214 OFFICE O/P EST MOD 30 MIN: CPT | Performed by: UROLOGY

## 2022-10-10 PROCEDURE — 81003 URINALYSIS AUTO W/O SCOPE: CPT | Performed by: UROLOGY

## 2022-10-10 NOTE — PROGRESS NOTES
[facility]  [place], [address]  [phone]    Courtney Andrade  : 1965     HPI   65 y.o., male returns in follow up for CaP. S/P RALP on 21. Final path showed [place] 6, T2Nx with neg margins. He has done well post op. Denied ED prior. Reports partial erections on Cialis. Rare EMILY. PSA was 0.008 on 3/22/22 and is now 0.009 on 10/3/22. Has R IH. Has not yet seen general surgery for this due to insurance reasons. Past Medical History:   Diagnosis Date    Environmental and seasonal allergies 3/29/2016    Essential hypertension with goal blood pressure less than 140/90 2016    Prostate cancer [facility])      Past Surgical History:   Procedure Laterality Date    COLONOSCOPY N/A 2017    COLONOSCOPY/ 24 performed by Gibson Thakur MD at [facility] ENDOSCOPY    COLONOSCOPY FLX DX W/COLLJ SPEC WHEN PFRMD  2017         CYST REMOVAL Right     wrist    PROSTATECTOMY  2021    SHOULDER ARTHROSCOPY Left           Current Outpatient Medications   Medication Sig Dispense Refill    cetirizine (ZYRTEC) 10 MG tablet Take 1 tablet by mouth daily as needed for Allergies or Rhinitis 30 tablet 11    losartan (COZAAR) 50 MG tablet Take 1 tablet by mouth in the morning. TAKE 1 TABLET BY MOUTH EVERY DAY Indications: high blood pressure. 30 tablet 5    albuterol sulfate HFA (PROVENTIL;VENTOLIN;PROAIR) 108 (90 Base) MCG/ACT inhaler Inhale 1 puff into the lungs every 6 hours as needed for Wheezing or Shortness of Breath 18 g 2    sildenafil (VIAGRA) 100 MG tablet Take 100 mg by mouth daily as needed       No current facility-administered medications for this visit.      No Known Allergies  Social History     Socioeconomic History    Marital status: Single     Spouse name: Not on file    Number of children: Not on file    Years of education: Not on file    Highest education level: Not on file   Occupational History    Not on file   Tobacco Use    Smoking status: Never Smokeless tobacco: Never   Substance and Sexual Activity    Alcohol use: Yes     Alcohol/week: 0.0 standard drinks    Drug use: No    Sexual activity: Not on file   Other Topics Concern    Not on file   Social History Narrative    He is a  for his shift making auto parts for [employer]     Social Determinants of Health     Financial Resource Strain: Medium Risk    Difficulty of Paying Living Expenses: Somewhat hard   Food Insecurity: No Food Insecurity    Worried About Running Out of Food in the Last Year: Never true    Ran Out of Food in the Last Year: Never true   Transportation Needs: No Transportation Needs    Lack of Transportation (Medical): No    Lack of Transportation (Non-Medical): No   Physical Activity: Not on file   Stress: Not on file   Social Connections: Not on file   Intimate Partner Violence: Not on file   Housing Stability: Not on file     Family History   Problem Relation Age of Onset    No Known Problems Brother     Suicide Brother     No Known Problems Brother     No Known Problems Brother     No Known Problems Sister     Alcohol Abuse Father     Other Father         alcoholism    Other Mother         hemorrhage       Review of Systems  All systems reviewed and are negative at this time. Physical Exam  There were no vitals taken for this visit.   General appearance - alert, well appearing, and in no distress  Mental status - alert, oriented to person, place, and time  Eyes - extraocular eye movements intact, sclera anicteric  Abdomen - soft, nontender, nondistended, no masses or organomegaly  Neurological -  normal speech, no focal findings or movement disorder noted  Skin - normal coloration and turgor      Urinalysis  UA - Dipstick  Results for orders placed or performed in visit on 10/10/22   AMB POC URINALYSIS DIP STICK AUTO W/O MICRO   Result Value Ref Range    Color (UA POC)      Clarity (UA POC)      Glucose, Urine, POC Negative Negative    Bilirubin, Urine, POC Negative Negative KETONES, Urine, POC Negative Negative    Specific Gravity, Urine, POC 1.020 1.001 - 1.035    Blood (UA POC) Negative Negative    pH, Urine, POC 6.0 4.6 - 8.0    Protein, Urine, POC Negative Negative    Urobilinogen, POC 1 mg/dL     Nitrite, Urine, POC Negative Negative    Leukocyte Esterase, Urine, POC Negative Negative       UA - Micro  WBC - 0  RBC - 0  Bacteria - 0  Epith - 0    Assessment/Plan    ICD-10-CM    1. Malignant neoplasm of prostate (HCC)  C61 AMB POC URINALYSIS DIP STICK AUTO W/O MICRO     PSA, ultrasensitive      2. Non-recurrent unilateral inguinal hernia without obstruction or gangrene  K40.90         RTO in 6 mo with PSA prior. Pt instructed to call for gen surg referral when ready to proceed.     THUAN RUIZ, DO

## 2022-12-29 ENCOUNTER — PREP FOR PROCEDURE (OUTPATIENT)
Dept: GASTROENTEROLOGY | Age: 57
End: 2022-12-29

## 2022-12-29 DIAGNOSIS — Z12.11 ENCOUNTER FOR SCREENING COLONOSCOPY: Primary | ICD-10-CM

## 2022-12-30 RX ORDER — SODIUM CHLORIDE 9 MG/ML
25 INJECTION, SOLUTION INTRAVENOUS PRN
Status: CANCELLED | OUTPATIENT
Start: 2022-12-30

## 2022-12-30 RX ORDER — SODIUM CHLORIDE 0.9 % (FLUSH) 0.9 %
5-40 SYRINGE (ML) INJECTION EVERY 12 HOURS SCHEDULED
Status: CANCELLED | OUTPATIENT
Start: 2022-12-30

## 2022-12-30 RX ORDER — POLYETHYLENE GLYCOL 3350, SODIUM SULFATE ANHYDROUS, SODIUM BICARBONATE, SODIUM CHLORIDE, POTASSIUM CHLORIDE 236; 22.74; 6.74; 5.86; 2.97 G/4L; G/4L; G/4L; G/4L; G/4L
4 POWDER, FOR SOLUTION ORAL ONCE
Qty: 4000 ML | Refills: 0 | Status: SHIPPED | OUTPATIENT
Start: 2022-12-30 | End: 2022-12-30

## 2022-12-30 RX ORDER — SODIUM CHLORIDE 0.9 % (FLUSH) 0.9 %
5-40 SYRINGE (ML) INJECTION PRN
Status: CANCELLED | OUTPATIENT
Start: 2022-12-30

## 2023-01-18 ENCOUNTER — OFFICE VISIT (OUTPATIENT)
Dept: UROLOGY | Age: 58
End: 2023-01-18
Payer: COMMERCIAL

## 2023-01-18 DIAGNOSIS — N52.9 ERECTILE DYSFUNCTION, UNSPECIFIED ERECTILE DYSFUNCTION TYPE: ICD-10-CM

## 2023-01-18 DIAGNOSIS — C61 MALIGNANT NEOPLASM OF PROSTATE (HCC): Primary | ICD-10-CM

## 2023-01-18 PROCEDURE — 99214 OFFICE O/P EST MOD 30 MIN: CPT | Performed by: NURSE PRACTITIONER

## 2023-01-18 ASSESSMENT — ENCOUNTER SYMPTOMS
ABDOMINAL PAIN: 0
BACK PAIN: 0

## 2023-01-18 NOTE — PROGRESS NOTES
[facility]  [address]  Arnulfo Kim [address], [address]  [address]  : 1965    Chief Complaint   Patient presents with    Other     To discuss Viagra          HPI     Eliazar Perez is a 65 y.o. male who is followed for CaP. S/P RALP on 21. Final path showed [place] 6, T2Nx with neg margins. He has done well post op. Denied ED prior. Reports partial erections on Cialis. Most recently on Viagra. No success. Rare EMILY. PSA was 0.008 on 3/22/22 and is now 0.009 on 10/3/22. Has R IH. Has not yet seen general surgery for this due to insurance reasons. Here to discuss ED tx options. Past Medical History:   Diagnosis Date    Environmental and seasonal allergies 3/29/2016    Essential hypertension with goal blood pressure less than 140/90 2016    Prostate cancer [facility])      Past Surgical History:   Procedure Laterality Date    COLONOSCOPY N/A 2017    COLONOSCOPY/ 24 performed by Constance Gil MD at [facility] ENDOSCOPY    COLONOSCOPY FLX DX W/COLLJ SPEC WHEN PFRMD  2017         CYST REMOVAL Right     wrist    PROSTATECTOMY  2021    SHOULDER ARTHROSCOPY Left           Current Outpatient Medications   Medication Sig Dispense Refill    Papav-Phentolamine-Alprostadil 150- MG-MG-MCG SOLR Papaverine 30 mg/ml, Phentolamine 1.0 mg/ml, PGE 10mg/ml. Use 0.10 cc and increase by 0.05 cc up to 0.5 cc as needed. Please provide all syringes and needles 3 each 3    losartan (COZAAR) 50 MG tablet Take 1 tablet by mouth in the morning. TAKE 1 TABLET BY MOUTH EVERY DAY Indications: high blood pressure.  30 tablet 5    sildenafil (VIAGRA) 100 MG tablet Take 100 mg by mouth daily as needed      cetirizine (ZYRTEC) 10 MG tablet Take 1 tablet by mouth daily as needed for Allergies or Rhinitis (Patient not taking: Reported on 2023) 30 tablet 11    albuterol sulfate HFA (PROVENTIL;VENTOLIN;PROAIR) 108 (90 Base) MCG/ACT inhaler Inhale 1 puff into the lungs every 6 hours as needed for Wheezing or Shortness of Breath (Patient not taking: Reported on 1/18/2023) 18 g 2     No current facility-administered medications for this visit. No Known Allergies  Social History     Socioeconomic History    Marital status: Single     Spouse name: Not on file    Number of children: Not on file    Years of education: Not on file    Highest education level: Not on file   Occupational History    Not on file   Tobacco Use    Smoking status: Never    Smokeless tobacco: Never   Substance and Sexual Activity    Alcohol use: Yes     Alcohol/week: 0.0 standard drinks    Drug use: No    Sexual activity: Not on file   Other Topics Concern    Not on file   Social History Narrative    He is a  for his shift making auto parts for [employer]     Social Determinants of Health     Financial Resource Strain: Medium Risk    Difficulty of Paying Living Expenses: Somewhat hard   Food Insecurity: No Food Insecurity    Worried About Running Out of Food in the Last Year: Never true    Ran Out of Food in the Last Year: Never true   Transportation Needs: No Transportation Needs    Lack of Transportation (Medical): No    Lack of Transportation (Non-Medical): No   Physical Activity: Not on file   Stress: Not on file   Social Connections: Not on file   Intimate Partner Violence: Not on file   Housing Stability: Not on file     Family History   Problem Relation Age of Onset    No Known Problems Brother     Suicide Brother     No Known Problems Brother     No Known Problems Brother     No Known Problems Sister     Alcohol Abuse Father     Other Father         alcoholism    Other Mother         hemorrhage       Review of Systems  Constitutional:   Negative for fever. GI:  Negative for abdominal pain. Genitourinary: Positive for erectile dysfunction. Musculoskeletal:  Negative for back pain.     PHYSICAL EXAM    General appearance - well appearing and in no distress  Mental status - alert, oriented to person, place, and time  Neck - supple, no significant adenopathy  Chest/Lung-  Quiet, even and easy respiratory effort without use of accessory muscles  Skin - normal coloration and turgor, no rashes        Assessment and Plan    ICD-10-CM    1. Malignant neoplasm of prostate ([facility] [address]  C61       2. Erectile dysfunction, unspecified erectile dysfunction type  N52.9           Prostate CA- cont to follow. Recheck due 4/23. Need to schedule. ED- We discussed the potential comorbidities that can lead to erectile dysfunction. We discussed treatment options including PO PDE 5 inhibitors, trimix and PGE1 intracorporeal injection, Muse, penile pump, and inflatable penile prosthesis. Pt. has decided to move forward with Trimix injection. RTO for trimix training. Advised to call sooner if needed. Phil Koch, MEGANP, APRN - CNP  Dr. Aishwarya Pool is supervising physician today and he approves plan of care.

## 2023-01-23 NOTE — PERIOP NOTE
Patient verified name, , and procedure. Type: 1a; abbreviated assessment per anesthesia guidelines    Labs per anesthesia: None    Instructed pt that they will be notified the day before their procedure by the GI Lab for time of arrival if their procedure is [facility] and Pre-op for [place] cases. Arrival times should be called by 5 pm. If no phone is received the patient should contact their respective hospital. The GI lab telephone number is [phone] and ES Pre-op is [phone]. Follow diet and prep instructions per office including NPO status. If patient has NOT received instructions from office patient is advised to call surgeon office, verbalizes understanding. Bath or shower the night before and the am of surgery with non-moisturizing soap. No lotions, oils, powders, cologne on skin. No make up, eye make up or jewelry. Wear loose fitting comfortable, clean clothing. Must have adult present in building the entire time . Medications for the day of procedure None, patient to hold no medication per anesthesia guidelines. The following discharge instructions reviewed with patient: medication given during procedure may cause drowsiness for several hours, therefore, do not drive or operate machinery for remainder of the day. You may not drink alcohol on the day of your procedure, please resume regular diet and activity unless otherwise directed. You may experience abdominal distention for several hours that is relieved by the passage of gas. Contact your physician if you have any of the following: fever or chills, severe abdominal pain or excessive amount of bleeding or a large amount when having a bowel movement.  Occasional specks of blood with bowel movement would not be unusual.

## 2023-01-26 ENCOUNTER — ANESTHESIA EVENT (OUTPATIENT)
Dept: ENDOSCOPY | Age: 58
End: 2023-01-26
Payer: COMMERCIAL

## 2023-01-26 ENCOUNTER — HOSPITAL ENCOUNTER (OUTPATIENT)
Age: 58
Setting detail: OUTPATIENT SURGERY
Discharge: HOME | End: 2023-01-26
Attending: INTERNAL MEDICINE | Admitting: INTERNAL MEDICINE
Payer: COMMERCIAL

## 2023-01-26 ENCOUNTER — ANESTHESIA (OUTPATIENT)
Dept: ENDOSCOPY | Age: 58
End: 2023-01-26
Payer: COMMERCIAL

## 2023-01-26 VITALS
BODY MASS INDEX: 24.05 KG/M2 | OXYGEN SATURATION: 98 % | RESPIRATION RATE: 18 BRPM | HEART RATE: 72 BPM | HEIGHT: 70 IN | TEMPERATURE: 97 F | DIASTOLIC BLOOD PRESSURE: 65 MMHG | SYSTOLIC BLOOD PRESSURE: 119 MMHG | WEIGHT: 168 LBS

## 2023-01-26 DIAGNOSIS — Z12.11 ENCOUNTER FOR SCREENING COLONOSCOPY: ICD-10-CM

## 2023-01-26 PROCEDURE — 6360000002 HC RX W HCPCS: Performed by: NURSE ANESTHETIST, CERTIFIED REGISTERED

## 2023-01-26 PROCEDURE — 2709999900 HC NON-CHARGEABLE SUPPLY: Performed by: INTERNAL MEDICINE

## 2023-01-26 PROCEDURE — 2580000003 HC RX 258: Performed by: NURSE ANESTHETIST, CERTIFIED REGISTERED

## 2023-01-26 PROCEDURE — 3700000001 HC ADD 15 MINUTES (ANESTHESIA): Performed by: INTERNAL MEDICINE

## 2023-01-26 PROCEDURE — 7100000010 HC PHASE II RECOVERY - FIRST 15 MIN: Performed by: INTERNAL MEDICINE

## 2023-01-26 PROCEDURE — 45378 DIAGNOSTIC COLONOSCOPY: CPT | Performed by: INTERNAL MEDICINE

## 2023-01-26 PROCEDURE — 3609027000 HC COLONOSCOPY: Performed by: INTERNAL MEDICINE

## 2023-01-26 PROCEDURE — 3700000000 HC ANESTHESIA ATTENDED CARE: Performed by: INTERNAL MEDICINE

## 2023-01-26 PROCEDURE — 7100000011 HC PHASE II RECOVERY - ADDTL 15 MIN: Performed by: INTERNAL MEDICINE

## 2023-01-26 RX ORDER — SODIUM CHLORIDE, SODIUM LACTATE, POTASSIUM CHLORIDE, CALCIUM CHLORIDE 600; 310; 30; 20 MG/100ML; MG/100ML; MG/100ML; MG/100ML
INJECTION, SOLUTION INTRAVENOUS CONTINUOUS PRN
Status: DISCONTINUED | OUTPATIENT
Start: 2023-01-26 | End: 2023-01-26 | Stop reason: SDUPTHER

## 2023-01-26 RX ORDER — SODIUM CHLORIDE 0.9 % (FLUSH) 0.9 %
5-40 SYRINGE (ML) INJECTION EVERY 12 HOURS SCHEDULED
Status: DISCONTINUED | OUTPATIENT
Start: 2023-01-26 | End: 2023-01-26 | Stop reason: HOSPADM

## 2023-01-26 RX ORDER — PROPOFOL 10 MG/ML
INJECTION, EMULSION INTRAVENOUS CONTINUOUS PRN
Status: DISCONTINUED | OUTPATIENT
Start: 2023-01-26 | End: 2023-01-26 | Stop reason: SDUPTHER

## 2023-01-26 RX ORDER — SODIUM CHLORIDE 0.9 % (FLUSH) 0.9 %
5-40 SYRINGE (ML) INJECTION PRN
Status: CANCELLED | OUTPATIENT
Start: 2023-01-26

## 2023-01-26 RX ORDER — SODIUM CHLORIDE 9 MG/ML
INJECTION, SOLUTION INTRAVENOUS PRN
Status: CANCELLED | OUTPATIENT
Start: 2023-01-26

## 2023-01-26 RX ORDER — SODIUM CHLORIDE 9 MG/ML
25 INJECTION, SOLUTION INTRAVENOUS PRN
Status: DISCONTINUED | OUTPATIENT
Start: 2023-01-26 | End: 2023-01-26 | Stop reason: HOSPADM

## 2023-01-26 RX ORDER — ONDANSETRON 2 MG/ML
4 INJECTION INTRAMUSCULAR; INTRAVENOUS
Status: CANCELLED | OUTPATIENT
Start: 2023-01-26 | End: 2023-01-27

## 2023-01-26 RX ORDER — LIDOCAINE HYDROCHLORIDE 10 MG/ML
1 INJECTION, SOLUTION INFILTRATION; PERINEURAL
Status: DISCONTINUED | OUTPATIENT
Start: 2023-01-26 | End: 2023-01-26 | Stop reason: HOSPADM

## 2023-01-26 RX ORDER — SODIUM CHLORIDE 0.9 % (FLUSH) 0.9 %
5-40 SYRINGE (ML) INJECTION EVERY 12 HOURS SCHEDULED
Status: CANCELLED | OUTPATIENT
Start: 2023-01-26

## 2023-01-26 RX ORDER — SODIUM CHLORIDE 9 MG/ML
INJECTION, SOLUTION INTRAVENOUS PRN
Status: DISCONTINUED | OUTPATIENT
Start: 2023-01-26 | End: 2023-01-26 | Stop reason: HOSPADM

## 2023-01-26 RX ORDER — SODIUM CHLORIDE 0.9 % (FLUSH) 0.9 %
5-40 SYRINGE (ML) INJECTION PRN
Status: DISCONTINUED | OUTPATIENT
Start: 2023-01-26 | End: 2023-01-26 | Stop reason: HOSPADM

## 2023-01-26 RX ADMIN — PROPOFOL 300 MCG/KG/MIN: 10 INJECTION, EMULSION INTRAVENOUS at 09:37

## 2023-01-26 RX ADMIN — SODIUM CHLORIDE, SODIUM LACTATE, POTASSIUM CHLORIDE, AND CALCIUM CHLORIDE: 600; 310; 30; 20 INJECTION, SOLUTION INTRAVENOUS at 09:35

## 2023-01-26 ASSESSMENT — ENCOUNTER SYMPTOMS
ABDOMINAL PAIN: 0
SHORTNESS OF BREATH: 0
TROUBLE SWALLOWING: 0
BLOOD IN STOOL: 0
COLOR CHANGE: 0
VOMITING: 0

## 2023-01-26 ASSESSMENT — PAIN SCALES - GENERAL
PAINLEVEL_OUTOF10: 0

## 2023-01-26 ASSESSMENT — PAIN - FUNCTIONAL ASSESSMENT: PAIN_FUNCTIONAL_ASSESSMENT: 0-10

## 2023-01-26 NOTE — ANESTHESIA PRE PROCEDURE
Department of Anesthesiology  Preprocedure Note       Name:  Fannie Atkinson   Age:  65 y.o.  :  1965                                          MRN:  [id number]         Date:  2023      Surgeon: Karis Harkins):  Derek Garcia MD    Procedure: Procedure(s):  COLORECTAL CANCER SCREENING, NOT HIGH RISK    Medications prior to admission:   Prior to Admission medications    Medication Sig Start Date End Date Taking? Authorizing Provider   Papav-Phentolamine-Alprostadil 150- MG-MG-MCG SOLR Papaverine 30 mg/ml, Phentolamine 1.0 mg/ml, PGE 10mg/ml. Use 0.10 cc and increase by 0.05 cc up to 0.5 cc as needed. Please provide all syringes and needles  Patient not taking: Reported on 2023   FLORECITA Cruz CNP   cetirizine (ZYRTEC) 10 MG tablet Take 1 tablet by mouth daily as needed for Allergies or Rhinitis  Patient not taking: No sig reported 22   FLORECITA Mortensen CNP   losartan (COZAAR) 50 MG tablet Take 1 tablet by mouth in the morning. TAKE 1 TABLET BY MOUTH EVERY DAY Indications: high blood pressure.  22   FLORECITA Mortensen CNP   albuterol sulfate HFA (PROVENTIL;VENTOLIN;PROAIR) 108 (90 Base) MCG/ACT inhaler Inhale 1 puff into the lungs every 6 hours as needed for Wheezing or Shortness of Breath  Patient not taking: No sig reported 22   FLORECITA Mortensen CNP   sildenafil (VIAGRA) 100 MG tablet Take 100 mg by mouth daily as needed 22   Ar Automatic Reconciliation       Current medications:    Current Facility-Administered Medications   Medication Dose Route Frequency Provider Last Rate Last Admin    lidocaine 1 % injection 1 mL  1 mL IntraDERmal Once PRN Gaston Gonzalez MD        famotidine (PEPCID) 20 mg in sodium chloride (PF) 0.9 % 10 mL injection  20 mg IntraVENous Once Gaston Gonzalez MD        sodium chloride flush 0.9 % injection 5-40 mL  5-40 mL IntraVENous 2 times per day Gaston Gonzalez MD        sodium chloride flush 0.9 % injection 5-40 mL  5-40 mL IntraVENous PRN Trino Islas MD        0.9 % sodium chloride infusion   IntraVENous PRN Trino Islas MD        sodium chloride flush 0.9 % injection 5-40 mL  5-40 mL IntraVENous 2 times per day Judie Richard MD        sodium chloride flush 0.9 % injection 5-40 mL  5-40 mL IntraVENous PRN Judie Richard MD        0.9 % sodium chloride infusion  25 mL IntraVENous PRN Judie Richard MD           Allergies:  No Known Allergies    Problem List:    Patient Active Problem List   Diagnosis Code    Environmental and seasonal allergies J30.89    Essential hypertension with goal blood pressure less than 140/90 I10    Prostate cancer ([facility] [address] C61    Encounter for screening colonoscopy Z12.11       Past Medical History:        Diagnosis Date    Environmental and seasonal allergies 3/29/2016    Essential hypertension with goal blood pressure less than 140/90 9/30/2016    Prostate cancer [facility])        Past Surgical History:        Procedure Laterality Date    COLONOSCOPY N/A 1/27/2017    COLONOSCOPY/  performed by Thuy Munoz [facility] at [facility] ENDOSCOPY    COLONOSCOPY FLX DX W/COLLJ SPEC WHEN PFRMD  1/27/2017         CYST REMOVAL Right     wrist    PROSTATECTOMY  02/04/2021    SHOULDER ARTHROSCOPY Left             Social History:    Social History     Tobacco Use    Smoking status: Never    Smokeless tobacco: Never   Substance Use Topics    Alcohol use: Yes     Comment: occ                                Counseling given: Not Answered      Vital Signs (Current):   Vitals:    01/23/23 0830 01/26/23 0856   BP:  (!) 154/79   Pulse:  59   Resp:  18   Temp:  97.8 °F (36.6 °C)   TempSrc:  Oral   SpO2:  98%   Weight: 170 lb (77.1 kg) 168 lb (76.2 kg)   Height: 5' 10\" (1.778 m)                                               BP Readings from Last 3 Encounters:   01/26/23 (!) 154/79   08/10/22 (!) 155/86   07/28/22 130/80       NPO Status: Time of last liquid consumption: 2300 Time of last solid consumption: 2300                        Date of last liquid consumption: 01/25/23                        Date of last solid food consumption: 01/24/23    BMI:   Wt Readings from Last 3 Encounters:   01/26/23 168 lb (76.2 kg)   08/10/22 169 lb (76.7 kg)   07/28/22 162 lb 6.4 oz (73.7 kg)     Body mass index is 24.11 kg/m². CBC:   Lab Results   Component Value Date/Time    WBC 3.4 07/28/2022 04:25 PM    RBC 4.39 07/28/2022 04:25 PM    HGB 14.4 07/28/2022 04:25 PM    HCT 42.0 07/28/2022 04:25 PM    MCV 95.7 07/28/2022 04:25 PM    RDW 12.6 07/28/2022 04:25 PM     07/28/2022 04:25 PM       CMP:   Lab Results   Component Value Date/Time     07/28/2022 04:25 PM    K 3.8 07/28/2022 04:25 PM     07/28/2022 04:25 PM    CO2 30 07/28/2022 04:25 PM    BUN 12 07/28/2022 04:25 PM    CREATININE 1.10 07/28/2022 04:25 PM    GFRAA >60 07/28/2022 04:25 PM    AGRATIO 1.0 01/28/2022 12:20 PM    LABGLOM >60 07/28/2022 04:25 PM    GLUCOSE 82 07/28/2022 04:25 PM    PROT 7.7 07/28/2022 04:25 PM    CALCIUM 8.8 07/28/2022 04:25 PM    BILITOT 0.6 07/28/2022 04:25 PM    ALKPHOS 67 07/28/2022 04:25 PM    ALKPHOS 71 01/28/2022 12:20 PM    AST 19 07/28/2022 04:25 PM    ALT 24 07/28/2022 04:25 PM       POC Tests: No results for input(s): POCGLU, POCNA, POCK, POCCL, POCBUN, POCHEMO, POCHCT in the last 72 hours.     Coags:   Lab Results   Component Value Date/Time    PROTIME 13.5 02/01/2021 04:05 PM    INR 1.0 02/01/2021 04:05 PM       HCG (If Applicable): No results found for: PREGTESTUR, PREGSERUM, HCG, HCGQUANT     ABGs: No results found for: PHART, PO2ART, VZX5HMT, DKT7DCS, BEART, O8BMUKCE     Type & Screen (If Applicable):  No results found for: LABABO, LABRH    Drug/Infectious Status (If Applicable):  No results found for: HIV, HEPCAB    COVID-19 Screening (If Applicable):   Lab Results   Component Value Date/Time    COVID19 Not Detected 02/01/2021 12:00 AM           Anesthesia Evaluation  Patient summary reviewed and Nursing notes reviewed no history of anesthetic complications:   Airway: Mallampati: II  TM distance: >3 FB     Mouth opening: > = 3 FB   Dental: normal exam         Pulmonary:normal exam    (+) asthma: seasonal asthma,                            Cardiovascular:  Exercise tolerance: good (>4 METS),   (+) hypertension: mild,                   Neuro/Psych:   Negative Neuro/Psych ROS              GI/Hepatic/Renal: Neg GI/Hepatic/Renal ROS            Endo/Other: Negative Endo/Other ROS                    Abdominal:             Vascular: negative vascular ROS. Other Findings:           Anesthesia Plan      TIVA     ASA 2     (Discussed TIVA with its benefits (lower risk of nausea and sore throat, etc.) and risks including possible awareness, patient understands and elects to proceed)  Induction: intravenous. Anesthetic plan and risks discussed with patient and child/children.                         Valentin Uriostegui MD   1/26/2023

## 2023-01-26 NOTE — ANESTHESIA POSTPROCEDURE EVALUATION
Department of Anesthesiology  Postprocedure Note    Patient: Qi Soriano  MRN: [id number]  YOB: 1965  Date of evaluation: 1/26/2023      Procedure Summary     Date: 01/26/23 Room / Location: [facility] ENDO 01 / [facility] ENDOSCOPY    Anesthesia Start: 0935 Anesthesia Stop: 1000    Procedure: COLORECTAL CANCER SCREENING, NOT HIGH RISK (Lower GI Region) Diagnosis:       Encounter for screening colonoscopy      (Encounter for screening colonoscopy [Z12.11])    Providers: Dudley Luz MD Responsible Provider: Sandi Villegas MD    Anesthesia Type: TIVA ASA Status: 2          Anesthesia Type: TIVA    Pradeep Phase I:      Pradeep Phase II: Pradeep Score: 9      Anesthesia Post Evaluation    Patient location during evaluation: bedside  Patient participation: complete - patient participated  Level of consciousness: awake and alert  Airway patency: patent  Nausea & Vomiting: nausea  Complications: no  Cardiovascular status: hemodynamically stable  Respiratory status: acceptable, nonlabored ventilation and spontaneous ventilation  Hydration status: euvolemic
70kg +/-10%/ideal

## 2023-01-26 NOTE — H&P
Claudia Wick (:  1965) is a 65 y.o. male, new patient here for evaluation of the following chief complaint(s): screening colonoscopy  No chief complaint on file. ASSESSMENT/PLAN:screening colonoscopy/ Colonoscopy  [unfilled]         Subjective   SUBJECTIVE/OBJECTIVE  Patient presents to arrange screening colonoscopy. Denied any rectal bleeding or change in bowel habits. No family history of colon cancer. Past Medical History:   Diagnosis Date    Environmental and seasonal allergies 3/29/2016    Essential hypertension with goal blood pressure less than 140/90 2016    Prostate cancer [facility])        Past Surgical History:   Procedure Laterality Date    COLONOSCOPY N/A 2017    COLONOSCOPY/  performed by Latosha Espinosa MD at [facility] ENDOSCOPY    COLONOSCOPY FLX DX W/COLLJ [facility] WHEN PFRMD  2017         CYST REMOVAL Right     wrist    PROSTATECTOMY  2021    SHOULDER ARTHROSCOPY Left                   No Known Allergies       Review of Systems   Constitutional:  Negative for appetite change. HENT:  Negative for mouth sores and trouble swallowing. Respiratory:  Negative for shortness of breath. Cardiovascular:  Negative for chest pain. Gastrointestinal:  Negative for abdominal pain, blood in stool and vomiting. Skin:  Negative for color change. Allergic/Immunologic: Negative for food allergies. Neurological:  Negative for seizures and weakness. Hematological:  Does not bruise/bleed easily. Objective   Physical Exam  HENT:      Head: Normocephalic. Mouth/Throat:      Mouth: Mucous membranes are moist.   Eyes:      General: No scleral icterus. Cardiovascular:      Rate and Rhythm: Normal rate and regular rhythm. Pulmonary:      Effort: No respiratory distress. Abdominal:      General: There is no distension. Tenderness: There is no abdominal tenderness. There is no rebound.    Lymphadenopathy:      Cervical: No cervical adenopathy. Skin:     Coloration: Skin is not jaundiced. Findings: No bruising. Neurological:      General: No focal deficit present. Mental Status: He is alert. Current Facility-Administered Medications   Medication Dose Route Frequency Provider Last Rate Last Admin    lidocaine 1 % injection 1 mL  1 mL IntraDERmal Once PRN Drea Del Valle MD        famotidine (PEPCID) 20 mg in sodium chloride (PF) 0.9 % 10 mL injection  20 mg IntraVENous Once Drea Del Valle MD        sodium chloride flush 0.9 % injection 5-40 mL  5-40 mL IntraVENous 2 times per day Drea Del Valle MD        sodium chloride flush 0.9 % injection 5-40 mL  5-40 mL IntraVENous PRN Drea Del Valle MD        0.9 % sodium chloride infusion   IntraVENous PRN Drea Del Valle MD        sodium chloride flush 0.9 % injection 5-40 mL  5-40 mL IntraVENous 2 times per day Ruffin Ahumada, MD        sodium chloride flush 0.9 % injection 5-40 mL  5-40 mL IntraVENous PRN Ruffin Ahumada, MD        0.9 % sodium chloride infusion  25 mL IntraVENous PRN Ruffin Ahumada, MD           Family History   Problem Relation Age of Onset    No Known Problems Brother     Suicide Brother     No Known Problems Brother     No Known Problems Brother     No Known Problems Sister     Alcohol Abuse Father     Other Father         alcoholism    Other Mother         hemorrhage       No follow-ups on file. An electronic signature was used to authenticate this note.     --Ruffin Ahumada, MD

## 2023-01-26 NOTE — PROCEDURES
Operative Report    Patient: Chastity Salas MRN: [id number]      YOB: 1965  Age: 65 y.o. Sex: male            Indications:  screening for colon cancer [unfilled]     Preoperative Evaluation: The patient was evaluated prior to the procedure in the GI lab admission area, the patient ASA was recorded . Consent was obtained from the patient with the risk of perforation bleeding and aspiration. Anesthesia: ROGELIO-per anesthesia    Complications: None; patient tolerated the procedure well. EBL -insignificant      Procedure: The patient was sedated in the left lateral decubitus position. Scope was advanced from the rectum to the cecum. Evaluation was performed to the cecum twice. The scope was withdrawn to the rectum, retroflexed view was performed. The rectal exam was normal.  Preparation was good [place] score of 3/3/3;9 . Findings:   Exam to the cecum. 2 passes performed into the ascending colon. Normal cecum ascending transverse and descending mucosa. Normal sigmoid and rectal mucosa. Circumferential hemorrhoid at the dentate line noted.   No sign of colon polyps      Postoperative Diagnosis: Normal screening colonoscopy    57757 Colonoscopy, Flexible; diagnostic       Recommendations:    - colon in 10 years      Signed By:  Pan Cotter MD     January 26, 2023

## 2023-01-26 NOTE — PROGRESS NOTES
1025-Discharge instructions were reviewed with patient and pts son, Jen Peng. An opportunity was given for questions. Patient verbalized understanding, and has no questions at this time. 1045-To lobby via wheelchair accompanied by staff. Discharged to home in good condition via private vehicle.

## 2023-01-26 NOTE — DISCHARGE INSTRUCTIONS
Gastrointestinal Colonoscopy/Flexible Sigmoidoscopy - Lower Exam Discharge Instructions  Call Dr. Meet Chilel at [phone] for any problems or questions. Contact the doctors office for follow up appointment as directed  Medication may cause drowsiness for several hours, therefore, do not drive or operate machinery for remainder of the day. No alcohol today. Ordinarily, you may resume regular diet and activity after exam unless otherwise specified by your physician. Because of air put into your colon during exam, you may experience some abdominal distension, relieved by the passage of gas, for several hours. Contact your physician if you have any of the following:  Excessive amount of bleeding - large amount when having a bowel movement.   Occasional specks of blood with bowel movement would not be unusual.  Severe abdominal pain  Fever or Chills

## 2023-01-27 ENCOUNTER — OFFICE VISIT (OUTPATIENT)
Dept: FAMILY MEDICINE CLINIC | Facility: CLINIC | Age: 58
End: 2023-01-27
Payer: COMMERCIAL

## 2023-01-27 VITALS
TEMPERATURE: 98.6 F | DIASTOLIC BLOOD PRESSURE: 62 MMHG | OXYGEN SATURATION: 95 % | SYSTOLIC BLOOD PRESSURE: 138 MMHG | HEIGHT: 70 IN | HEART RATE: 64 BPM | BODY MASS INDEX: 24.79 KG/M2 | WEIGHT: 173.2 LBS

## 2023-01-27 DIAGNOSIS — D72.829 LEUKOCYTOSIS, UNSPECIFIED TYPE: ICD-10-CM

## 2023-01-27 DIAGNOSIS — C61 PROSTATE CANCER (HCC): ICD-10-CM

## 2023-01-27 DIAGNOSIS — Z79.899 MEDICATION MANAGEMENT: ICD-10-CM

## 2023-01-27 DIAGNOSIS — J30.89 ENVIRONMENTAL AND SEASONAL ALLERGIES: ICD-10-CM

## 2023-01-27 DIAGNOSIS — I10 ESSENTIAL HYPERTENSION WITH GOAL BLOOD PRESSURE LESS THAN 140/90: Primary | ICD-10-CM

## 2023-01-27 LAB
BASOPHILS # BLD: 0.1 K/UL (ref 0–0.2)
BASOPHILS NFR BLD: 1 % (ref 0–2)
DIFFERENTIAL METHOD BLD: ABNORMAL
EOSINOPHIL # BLD: 0.3 K/UL (ref 0–0.8)
EOSINOPHIL NFR BLD: 6 % (ref 0.5–7.8)
ERYTHROCYTE [DISTWIDTH] IN BLOOD BY AUTOMATED COUNT: 12.5 % (ref 11.9–14.6)
HCT VFR BLD AUTO: 41.7 % (ref 41.1–50.3)
HGB BLD-MCNC: 14.7 G/DL (ref 13.6–17.2)
IMM GRANULOCYTES # BLD AUTO: 0 K/UL (ref 0–0.5)
IMM GRANULOCYTES NFR BLD AUTO: 0 % (ref 0–5)
LYMPHOCYTES # BLD: 1.6 K/UL (ref 0.5–4.6)
LYMPHOCYTES NFR BLD: 35 % (ref 13–44)
MCH RBC QN AUTO: 33.2 PG (ref 26.1–32.9)
MCHC RBC AUTO-ENTMCNC: 35.3 G/DL (ref 31.4–35)
MCV RBC AUTO: 94.1 FL (ref 82–102)
MONOCYTES # BLD: 0.4 K/UL (ref 0.1–1.3)
MONOCYTES NFR BLD: 9 % (ref 4–12)
NEUTS SEG # BLD: 2.4 K/UL (ref 1.7–8.2)
NEUTS SEG NFR BLD: 49 % (ref 43–78)
NRBC # BLD: 0 K/UL (ref 0–0.2)
PLATELET # BLD AUTO: 185 K/UL (ref 150–450)
PMV BLD AUTO: 12.3 FL (ref 9.4–12.3)
RBC # BLD AUTO: 4.43 M/UL (ref 4.23–5.6)
WBC # BLD AUTO: 4.8 K/UL (ref 4.3–11.1)

## 2023-01-27 PROCEDURE — 99213 OFFICE O/P EST LOW 20 MIN: CPT | Performed by: NURSE PRACTITIONER

## 2023-01-27 PROCEDURE — 3074F SYST BP LT 130 MM HG: CPT | Performed by: NURSE PRACTITIONER

## 2023-01-27 PROCEDURE — 3078F DIAST BP <80 MM HG: CPT | Performed by: NURSE PRACTITIONER

## 2023-01-27 RX ORDER — LOSARTAN POTASSIUM 50 MG/1
50 TABLET ORAL DAILY
Qty: 90 TABLET | Refills: 1 | Status: SHIPPED | OUTPATIENT
Start: 2023-01-27

## 2023-01-27 ASSESSMENT — ENCOUNTER SYMPTOMS
RESPIRATORY NEGATIVE: 1
GASTROINTESTINAL NEGATIVE: 1

## 2023-01-27 ASSESSMENT — PATIENT HEALTH QUESTIONNAIRE - PHQ9
SUM OF ALL RESPONSES TO PHQ QUESTIONS 1-9: 0
SUM OF ALL RESPONSES TO PHQ9 QUESTIONS 1 & 2: 0
2. FEELING DOWN, DEPRESSED OR HOPELESS: 0
1. LITTLE INTEREST OR PLEASURE IN DOING THINGS: 0

## 2023-01-27 NOTE — PATIENT INSTRUCTIONS
Render [employer] Med Saline Nasal Gel Patient Education        Elevated Blood Pressure: Care Instructions  Your Care Instructions    Blood pressure is a measure of how hard the blood pushes against the walls of your arteries. It's normal for blood pressure to go up and down throughout the day. But if it stays up over time, you have high blood pressure. Two numbers tell you your blood pressure. The first number is the systolic pressure. It shows how hard the blood pushes when your heart is pumping. The second number is the diastolic pressure. It shows how hard the blood pushes between heartbeats, when your heart is relaxed and filling with blood. An ideal blood pressure in adults is less than 120/80 (say \"120 over 80\"). High blood pressure is 140/90 or higher. You have high blood pressure if your top number is 140 or higher or your bottom number is 90 or higher, or both. The main test for high blood pressure is simple, fast, and painless. To diagnose high blood pressure, your doctor will test your blood pressure at different times. After testing your blood pressure, your doctor may ask you to test it again when you are home. If you are diagnosed with high blood pressure, you can work with your doctor to make a long-term plan to manage it. Follow-up care is a key part of your treatment and safety. Be sure to make and go to all appointments, and call your doctor if you are having problems. It's also a good idea to know your test results and keep a list of the medicines you take. How can you care for yourself at home? Do not smoke. Smoking increases your risk for heart attack and stroke. If you need help quitting, talk to your doctor about stop-smoking programs and medicines. These can increase your chances of quitting for good. Stay at a healthy weight. Try to limit how much sodium you eat to less than 2,300 milligrams (mg) a day. Your doctor may ask you to try to eat less than 1,500 mg a day. Be physically active.  Get at least 30 minutes of exercise on most days of the week. Walking is a good choice. You also may want to do other activities, such as running, swimming, cycling, or playing tennis or team sports. Avoid or limit alcohol. Talk to your doctor about whether you can drink any alcohol. Eat plenty of fruits, vegetables, and low-fat dairy products. Eat less saturated and total fats. Learn how to check your blood pressure at home. When should you call for help? Call your doctor now or seek immediate medical care if:    Your blood pressure is much higher than normal (such as 180/110 or higher). You think high blood pressure is causing symptoms such as:  Severe headache. Blurry vision. Watch closely for changes in your health, and be sure to contact your doctor if:    You do not get better as expected. Where can you learn more? Go to https://InvestGlassbeckyeb.Concept3D. org and sign in to your Top Hat account. Enter W900 in the [employer] box to learn more about \"Elevated Blood Pressure: Care Instructions. \"     If you do not have an account, please click on the \"Sign Up Now\" link. Current as of: May 10, 2017  Content Version: 11.6  © 9509-7229 [employer]. Care instructions adapted under license by [facility] ([facility]). If you have questions about a medical condition or this instruction, always ask your healthcare professional. [address] any warranty or liability for your use of this information. Patient Education        DASH Diet: Care Instructions  Your Care Instructions     The DASH diet is an eating plan that can help lower your blood pressure. DASH stands for Dietary Approaches to Stop Hypertension. Hypertension is high blood pressure. The DASH diet focuses on eating foods that are high in calcium, potassium, and magnesium. These nutrients can lower blood pressure.  The foods that are highest in these nutrients are fruits, vegetables, low-fat dairy products, nuts, seeds, and legumes. But taking calcium, potassium, and magnesium supplements instead of eating foods that are high in those nutrients does not have the same effect. The DASH diet also includes whole grains, fish, and poultry. The DASH diet is one of several lifestyle changes your doctor may recommend to lower your high blood pressure. Your doctor may also want you to decrease the amount of sodium in your diet. Lowering sodium while following the DASH diet can lower blood pressure even further than just the DASH diet alone. Follow-up care is a key part of your treatment and safety. Be sure to make and go to all appointments, and call your doctor if you are having problems. It's also a good idea to know your test results and keep a list of the medicines you take. How can you care for yourself at home? Following the DASH diet  Eat 4 to 5 servings of fruit each day. A serving is 1 medium-sized piece of fruit, ½ cup chopped or canned fruit, 1/4 cup dried fruit, or 4 ounces (½ cup) of fruit juice. Choose fruit more often than fruit juice. Eat 4 to 5 servings of vegetables each day. A serving is 1 cup of lettuce or raw leafy vegetables, ½ cup of chopped or cooked vegetables, or 4 ounces (½ cup) of vegetable juice. Choose vegetables more often than vegetable juice. Get 2 to 3 servings of low-fat and fat-free dairy each day. A serving is 8 ounces of milk, 1 cup of yogurt, or 1 ½ ounces of cheese. Eat 6 to 8 servings of grains each day. A serving is 1 slice of bread, 1 ounce of dry cereal, or ½ cup of cooked rice, pasta, or cooked cereal. Try to choose whole-grain products as much as possible. Limit lean meat, poultry, and fish to 2 servings each day. A serving is 3 ounces, about the size of a deck of cards. Eat 4 to 5 servings of nuts, seeds, and legumes (cooked dried beans, lentils, and split peas) each week. A serving is 1/3 cup of nuts, 2 tablespoons of seeds, or ½ cup of cooked beans or peas.   Limit fats and oils to 2 to 3 servings each day. A serving is 1 teaspoon of vegetable oil or 2 tablespoons of salad dressing. Limit sweets and added sugars to 5 servings or less a week. A serving is 1 tablespoon jelly or jam, ½ cup sorbet, or 1 cup of lemonade. Eat less than 2,300 milligrams (mg) of sodium a day. If you limit your sodium to 1,500 mg a day, you can lower your blood pressure even more. Be aware that all of these are the suggested number of servings for people who eat 1,800 to 2,000 calories a day. Your recommended number of servings may be different if you need more or fewer calories. Tips for success  Start small. Do not try to make dramatic changes to your diet all at once. You might feel that you are missing out on your favorite foods and then be more likely to not follow the plan. Make small changes, and stick with them. Once those changes become habit, add a few more changes. Try some of the following:  Make it a goal to eat a fruit or vegetable at every meal and at snacks. This will make it easy to get the recommended amount of fruits and vegetables each day. Try yogurt topped with fruit and nuts for a snack or healthy dessert. Add lettuce, tomato, cucumber, and onion to sandwiches. Combine a ready-made pizza crust with low-fat mozzarella cheese and lots of vegetable toppings. Try using tomatoes, squash, spinach, broccoli, carrots, cauliflower, and onions. Have a variety of cut-up vegetables with a low-fat dip as an appetizer instead of chips and dip. Sprinkle sunflower seeds or chopped almonds over salads. Or try adding chopped walnuts or almonds to cooked vegetables. Try some vegetarian meals using beans and peas. Add garbanzo or kidney beans to salads. Make burritos and tacos with mashed avendaño beans or black beans. Where can you learn more? Go to http://www.woods.com/ and enter H967 to learn more about \"DASH Diet: Care Instructions. \"  Current as of: September 7, 2022 Content Version: 13.5  © 7779-2780 [employer]wise[employer]. Care instructions adapted under license by [facility] ([facility]). If you have questions about a medical condition or this instruction, always ask your healthcare professional. [address] any warranty or liability for your use of this information.

## 2023-01-27 NOTE — PROGRESS NOTES
Mandie Pinto is a 65 y.o. male who presents today for the following:  Chief Complaint   Patient presents with    Hypertension     6 month follow up          No Known Allergies    Current Outpatient Medications   Medication Sig Dispense Refill    losartan (COZAAR) 50 MG tablet Take 1 tablet by mouth daily TAKE 1 TABLET BY MOUTH EVERY DAY Indications: high blood pressure 90 tablet 1    sildenafil (VIAGRA) 100 MG tablet Take 100 mg by mouth daily as needed      Papav-Phentolamine-Alprostadil 150- MG-MG-MCG SOLR Papaverine 30 mg/ml, Phentolamine 1.0 mg/ml, PGE 10mg/ml. Use 0.10 cc and increase by 0.05 cc up to 0.5 cc as needed. Please provide all syringes and needles (Patient not taking: No sig reported) 3 each 3    cetirizine (ZYRTEC) 10 MG tablet Take 1 tablet by mouth daily as needed for Allergies or Rhinitis (Patient not taking: No sig reported) 30 tablet 11    albuterol sulfate HFA (PROVENTIL;VENTOLIN;PROAIR) 108 (90 Base) MCG/ACT inhaler Inhale 1 puff into the lungs every 6 hours as needed for Wheezing or Shortness of Breath (Patient not taking: No sig reported) 18 g 2     No current facility-administered medications for this visit.        Past Medical History:   Diagnosis Date    Environmental and seasonal allergies 3/29/2016    Essential hypertension with goal blood pressure less than 140/90 9/30/2016    Prostate cancer [facility])        Past Surgical History:   Procedure Laterality Date    COLONOSCOPY N/A 1/27/2017    COLONOSCOPY/  performed by Cailin Mathew MD at [facility] ENDOSCOPY    COLONOSCOPY N/A 1/26/2023    COLORECTAL CANCER SCREENING, NOT HIGH RISK performed by Rosana Siu MD at [facility] DX W/COLLJ [address] WHEN PFRMD  1/27/2017         CYST REMOVAL Right     wrist    PROSTATECTOMY  02/04/2021    SHOULDER ARTHROSCOPY Left             Social History     Tobacco Use    Smoking status: Never    Smokeless tobacco: Never   Substance Use Topics    Alcohol use: Yes     Comment: occ        Family History   Problem Relation Age of Onset    No Known Problems Brother     Suicide Brother     No Known Problems Brother     No Known Problems Brother     No Known Problems Sister     Alcohol Abuse Father     Other Father         alcoholism    Other Mother         hemorrhage       HPI   Pt presents for follow up. He has hx of HTN, environmental/seasonal allergies, and prostate cancer. He is followed by urology. It is noted he had his screening colonoscopy yesterday. Review of Systems   Constitutional: Negative. HENT:  Positive for congestion. Respiratory: Negative. Cardiovascular: Negative. Gastrointestinal: Negative. Genitourinary: Negative. Musculoskeletal: Negative. Allergic/Immunologic: Negative for environmental allergies. Neurological: Negative. Psychiatric/Behavioral: Negative. /62   Pulse 64   Temp 98.6 °F (37 °C) (Oral)   Ht 5' 10\" (1.778 m)   Wt 173 lb 3.2 oz (78.6 kg)   SpO2 95%   BMI 24.85 kg/m²     Physical Exam  Constitutional:       General: He is not in acute distress. Appearance: Normal appearance. He is not ill-appearing. HENT:      Head: Normocephalic and atraumatic. Right Ear: External ear normal.      Left Ear: External ear normal.   Eyes:      Extraocular Movements: Extraocular movements intact. Conjunctiva/sclera: Conjunctivae normal.      Pupils: Pupils are equal, round, and reactive to light. Neck:      Vascular: No carotid bruit. Cardiovascular:      Rate and Rhythm: Normal rate and regular rhythm. Pulses: Normal pulses. Heart sounds: Normal heart sounds. Pulmonary:      Effort: Pulmonary effort is normal.      Breath sounds: Normal breath sounds. Abdominal:      General: Bowel sounds are normal. There is no distension. Palpations: Abdomen is soft. Tenderness: There is no abdominal tenderness. Musculoskeletal:         General: Normal range of motion.       Cervical back: Normal range of motion and neck supple. No rigidity or tenderness. Right lower leg: No edema. Left lower leg: No edema. Lymphadenopathy:      Cervical: No cervical adenopathy. Skin:     General: Skin is warm and dry. Coloration: Skin is not jaundiced or pale. Neurological:      General: No focal deficit present. Mental Status: He is alert and oriented to person, place, and time. Psychiatric:         Mood and Affect: Mood normal.         Behavior: Behavior normal.         Thought Content: Thought content normal.         Judgment: Judgment normal.        1. Essential hypertension with goal blood pressure less than 140/90  -     Basic Metabolic Panel; Future  -     CBC with Auto Differential; Future  -     losartan (COZAAR) 50 MG tablet; Take 1 tablet by mouth daily TAKE 1 TABLET BY MOUTH EVERY DAY Indications: high blood pressure, Disp-90 tablet, R-1Normal  2. Prostate cancer ([facility] [address]  3. Environmental and seasonal allergies  4. Medication management  -     Basic Metabolic Panel; Future  5. Leukocytosis, unspecified type  -     CBC with Auto Differential; Future     BP stable at goal.  Continues to f/u with urology for prostate cancer and ED. Patient informed, we will call with blood work results within one week. If you have not heard regarding results in over a week, please contact office. You can also review results on [facility]. Follow-up and Dispositions    Return in about 6 months (around 7/27/2023) for Routine Visit.          FLORECITA Hutchison - CNP

## 2023-01-28 PROBLEM — Z12.11 ENCOUNTER FOR SCREENING COLONOSCOPY: Status: RESOLVED | Noted: 2022-12-29 | Resolved: 2023-01-28

## 2023-01-28 LAB
ANION GAP SERPL CALC-SCNC: 7 MMOL/L (ref 2–11)
BUN SERPL-MCNC: 11 MG/DL (ref 6–23)
CALCIUM SERPL-MCNC: 9.3 MG/DL (ref 8.3–10.4)
CHLORIDE SERPL-SCNC: 111 MMOL/L (ref 101–110)
CO2 SERPL-SCNC: 25 MMOL/L (ref 21–32)
CREAT SERPL-MCNC: 1.1 MG/DL (ref 0.8–1.5)
GLUCOSE SERPL-MCNC: 92 MG/DL (ref 65–100)
POTASSIUM SERPL-SCNC: 3.9 MMOL/L (ref 3.5–5.1)
SODIUM SERPL-SCNC: 143 MMOL/L (ref 133–143)

## 2023-02-01 ENCOUNTER — OFFICE VISIT (OUTPATIENT)
Dept: UROLOGY | Age: 58
End: 2023-02-01
Payer: COMMERCIAL

## 2023-02-01 DIAGNOSIS — N52.9 ERECTILE DYSFUNCTION, UNSPECIFIED ERECTILE DYSFUNCTION TYPE: Primary | ICD-10-CM

## 2023-02-01 DIAGNOSIS — K40.90 RIGHT INGUINAL HERNIA: ICD-10-CM

## 2023-02-01 DIAGNOSIS — C61 MALIGNANT NEOPLASM OF PROSTATE (HCC): ICD-10-CM

## 2023-02-01 PROCEDURE — 99214 OFFICE O/P EST MOD 30 MIN: CPT | Performed by: NURSE PRACTITIONER

## 2023-02-01 ASSESSMENT — ENCOUNTER SYMPTOMS
EYES NEGATIVE: 1
RESPIRATORY NEGATIVE: 1

## 2023-02-01 NOTE — PROGRESS NOTES
[facility]  [address] [facility]    [address] [facility], [address] [facility]  [phone]          Niru Bolivar  : 1965    Chief Complaint   Patient presents with    Follow-up     Malignant neoplasm of prostate           HPI     Niru Bolivar is a 65 y.o. male  who is followed for CaP. S/P RALP on 21. Final path showed [place] 6, T2Nx with neg margins. He has done well post op. Denied ED prior. Reports partial erections on Cialis. Most recently on Viagra. No success. Rare EMILY. PSA was 0.008 on 3/22/22 and is now 0.009 on 10/3/22. Has R IH. Has not yet seen general surgery for this due to insurance reasons. Would like referral today. Here to discuss Trimix. Has rx. Did not bring medication. Past Medical History:   Diagnosis Date    Environmental and seasonal allergies 3/29/2016    Essential hypertension with goal blood pressure less than 140/90 2016    Prostate cancer [facility])      Past Surgical History:   Procedure Laterality Date    COLONOSCOPY N/A 2017    COLONOSCOPY/  performed by Pia Clarke MD at [facility] ENDOSCOPY    COLONOSCOPY N/A 2023    COLORECTAL CANCER SCREENING, NOT HIGH RISK performed by Erick Alves MD at [address] FLX DX W/COLLJ SPEC WHEN PFRMD  2017         CYST REMOVAL Right     wrist    PROSTATECTOMY  2021    SHOULDER ARTHROSCOPY Left           Current Outpatient Medications   Medication Sig Dispense Refill    losartan (COZAAR) 50 MG tablet Take 1 tablet by mouth daily TAKE 1 TABLET BY MOUTH EVERY DAY Indications: high blood pressure 90 tablet 1    Papav-Phentolamine-Alprostadil 150- MG-MG-MCG SOLR Papaverine 30 mg/ml, Phentolamine 1.0 mg/ml, PGE 10mg/ml. Use 0.10 cc and increase by 0.05 cc up to 0.5 cc as needed. Please provide all syringes and needles (Patient not taking: No sig reported) 3 each 3     No current facility-administered medications for this visit.      No Known Allergies  Social History     Socioeconomic History    Marital status: Single     Spouse name: Not on file    Number of children: Not on file    Years of education: Not on file    Highest education level: Not on file   Occupational History    Not on file   Tobacco Use    Smoking status: Never    Smokeless tobacco: Never   Substance and Sexual Activity    Alcohol use: Yes     Comment: occ    Drug use: No    Sexual activity: Not on file   Other Topics Concern    Not on file   Social History Narrative    He is a  for his shift making auto parts for [employer]     Social Determinants of Health     Financial Resource Strain: Medium Risk    Difficulty of Paying Living Expenses: Somewhat hard   Food Insecurity: No Food Insecurity    Worried About Running Out of Food in the Last Year: Never true    Ran Out of Food in the Last Year: Never true   Transportation Needs: No Transportation Needs    Lack of Transportation (Medical): No    Lack of Transportation (Non-Medical):  No   Physical Activity: Not on file   Stress: Not on file   Social Connections: Not on file   Intimate Partner Violence: Not on file   Housing Stability: Not on file     Family History   Problem Relation Age of Onset    No Known Problems Brother     Suicide Brother     No Known Problems Brother     No Known Problems Brother     No Known Problems Sister     Alcohol Abuse Father     Other Father         alcoholism    Other Mother         hemorrhage       Review of Systems  Constitutional: Negative  Skin: Negative skin ROS  Eyes: Eyes negative  ENT: HENT negative  Respiratory: Respiratory negative  Cardiovascular: Neg cardio ROS  GI: Neg GI ROS  Genitourinary: Genitourinary negative  Musculoskeletal: Musculoskeletal negative  Psychological: Neg psych ROS  Endocrine: Endocrine negative  Hem/Lymphatic: Hematologic/lymphatic negative    PHYSICAL EXAM    General appearance - well appearing and in no distress  Mental status - alert, oriented to person, place, and time  Neck - supple, no significant adenopathy  Chest/Lung-  Quiet, even and easy respiratory effort without use of accessory muscles  Skin - normal coloration and turgor, no rashes        Assessment and Plan    ICD-10-CM    1. Erectile dysfunction, unspecified erectile dysfunction type  N52.9       2. Malignant neoplasm of prostate ([facility] [address]  C61       3. Right inguinal hernia  K40.90 BS - Jessica Martínez DO, General Surgery, [facility]          ED- Trimix instructions reviewed in great detail. Will start w 0.10 cc and increase by 0.05 cc until desire results achieved. Max 0.50 cc. Prostate CA- due for PSA 4/23 and follow up after. This will be arranged today. RIH- referral to general surgery placed. Advised to call sooner if needed. Tere Agosto, MEGANP, APRN - CNP  Dr. Bakari Rios is supervising physician today and he approves plan of care.

## 2023-03-15 ENCOUNTER — PATIENT MESSAGE (OUTPATIENT)
Dept: FAMILY MEDICINE CLINIC | Facility: CLINIC | Age: 58
End: 2023-03-15

## 2023-03-15 DIAGNOSIS — R06.00 DYSPNEA, UNSPECIFIED TYPE: Primary | ICD-10-CM

## 2023-03-15 RX ORDER — ALBUTEROL SULFATE 90 UG/1
2 AEROSOL, METERED RESPIRATORY (INHALATION) EVERY 6 HOURS PRN
Qty: 1 EACH | Refills: 2 | Status: SHIPPED | OUTPATIENT
Start: 2023-03-15

## 2023-03-15 NOTE — TELEPHONE ENCOUNTER
From: Iron Arellano  To: Fabricio Mode  Sent: 3/15/2023 1:15 PM EDT  Subject: Breathing     Could you send in order for inhaler light problem breathing due to pollen . thanks

## 2023-03-16 ENCOUNTER — OFFICE VISIT (OUTPATIENT)
Dept: SURGERY | Age: 58
End: 2023-03-16
Payer: COMMERCIAL

## 2023-03-16 ENCOUNTER — PREP FOR PROCEDURE (OUTPATIENT)
Dept: SURGERY | Age: 58
End: 2023-03-16

## 2023-03-16 VITALS
HEART RATE: 71 BPM | DIASTOLIC BLOOD PRESSURE: 96 MMHG | WEIGHT: 172.4 LBS | SYSTOLIC BLOOD PRESSURE: 183 MMHG | BODY MASS INDEX: 24.74 KG/M2

## 2023-03-16 DIAGNOSIS — K40.90 RIGHT INGUINAL HERNIA: Primary | ICD-10-CM

## 2023-03-16 PROCEDURE — 3074F SYST BP LT 130 MM HG: CPT | Performed by: SURGERY

## 2023-03-16 PROCEDURE — 99204 OFFICE O/P NEW MOD 45 MIN: CPT | Performed by: SURGERY

## 2023-03-16 PROCEDURE — 3078F DIAST BP <80 MM HG: CPT | Performed by: SURGERY

## 2023-03-16 ASSESSMENT — ENCOUNTER SYMPTOMS
BACK PAIN: 0
DIARRHEA: 0
CHOKING: 0
STRIDOR: 0
COLOR CHANGE: 0
SINUS PRESSURE: 1
EYE DISCHARGE: 0
SHORTNESS OF BREATH: 0
SORE THROAT: 0
ABDOMINAL PAIN: 0
CONSTIPATION: 0
VOMITING: 0
EYE PAIN: 0
COUGH: 0
EYE ITCHING: 0
SINUS PAIN: 0
NAUSEA: 0

## 2023-03-16 NOTE — PROGRESS NOTES
fever.   HENT:  Positive for sinus pressure. Negative for sinus pain, sneezing and sore throat. Eyes:  Negative for pain, discharge and itching. Glasses   Respiratory:  Negative for cough, choking, shortness of breath and stridor. Gastrointestinal:  Negative for abdominal pain, constipation, diarrhea, nausea and vomiting. Endocrine: Negative for cold intolerance, heat intolerance and polydipsia. Genitourinary:  Negative for difficulty urinating, dysuria and hematuria. Musculoskeletal:  Negative for back pain, joint swelling and myalgias. Skin:  Negative for color change, pallor and rash. Allergic/Immunologic: Negative for environmental allergies and food allergies. Neurological:  Negative for dizziness, light-headedness and headaches. Hematological:  Does not bruise/bleed easily. Psychiatric/Behavioral:  Negative for confusion and sleep disturbance. The patient is not nervous/anxious. Physical Exam  Vitals and nursing note reviewed. HENT:      Head: Normocephalic and atraumatic. Eyes:      General: No scleral icterus. Pupils: Pupils are equal, round, and reactive to light. Cardiovascular:      Rate and Rhythm: Normal rate. Pulmonary:      Effort: Pulmonary effort is normal. No respiratory distress. Breath sounds: No stridor. Abdominal:      Palpations: Abdomen is soft. There is no mass. Hernia: A hernia is present. Hernia is present in the right inguinal area (moderate, reducible with difficulty). There is no hernia in the left inguinal area. Comments: Scars c/w prostatectomy. No periumbilical defect   Genitourinary:     Penis: Normal.       Testes: Normal.   Neurological:      General: No focal deficit present. Mental Status: He is alert and oriented to person, place, and time. Cranial Nerves: No cranial nerve deficit. Motor: No weakness.    Psychiatric:         Mood and Affect: Mood normal.         Behavior: Behavior normal.

## 2023-03-31 ENCOUNTER — TELEMEDICINE (OUTPATIENT)
Dept: FAMILY MEDICINE CLINIC | Facility: CLINIC | Age: 58
End: 2023-03-31
Payer: COMMERCIAL

## 2023-03-31 DIAGNOSIS — J30.1 ALLERGIC RHINITIS DUE TO POLLEN, UNSPECIFIED SEASONALITY: Primary | ICD-10-CM

## 2023-03-31 PROCEDURE — 99442 PR PHYS/QHP TELEPHONE EVALUATION 11-20 MIN: CPT | Performed by: NURSE PRACTITIONER

## 2023-03-31 RX ORDER — MONTELUKAST SODIUM 10 MG/1
10 TABLET ORAL DAILY
Qty: 30 TABLET | Refills: 3 | Status: SHIPPED | OUTPATIENT
Start: 2023-03-31

## 2023-03-31 SDOH — ECONOMIC STABILITY: FOOD INSECURITY: WITHIN THE PAST 12 MONTHS, YOU WORRIED THAT YOUR FOOD WOULD RUN OUT BEFORE YOU GOT MONEY TO BUY MORE.: NEVER TRUE

## 2023-03-31 SDOH — ECONOMIC STABILITY: INCOME INSECURITY: HOW HARD IS IT FOR YOU TO PAY FOR THE VERY BASICS LIKE FOOD, HOUSING, MEDICAL CARE, AND HEATING?: NOT VERY HARD

## 2023-03-31 SDOH — ECONOMIC STABILITY: HOUSING INSECURITY
IN THE LAST 12 MONTHS, WAS THERE A TIME WHEN YOU DID NOT HAVE A STEADY PLACE TO SLEEP OR SLEPT IN A SHELTER (INCLUDING NOW)?: NO

## 2023-03-31 SDOH — ECONOMIC STABILITY: FOOD INSECURITY: WITHIN THE PAST 12 MONTHS, THE FOOD YOU BOUGHT JUST DIDN'T LAST AND YOU DIDN'T HAVE MONEY TO GET MORE.: NEVER TRUE

## 2023-03-31 ASSESSMENT — PATIENT HEALTH QUESTIONNAIRE - PHQ9
SUM OF ALL RESPONSES TO PHQ QUESTIONS 1-9: 0
2. FEELING DOWN, DEPRESSED OR HOPELESS: 0
SUM OF ALL RESPONSES TO PHQ9 QUESTIONS 1 & 2: 0
SUM OF ALL RESPONSES TO PHQ QUESTIONS 1-9: 0
1. LITTLE INTEREST OR PLEASURE IN DOING THINGS: 0

## 2023-03-31 ASSESSMENT — ENCOUNTER SYMPTOMS
RESPIRATORY NEGATIVE: 1
ALLERGIC/IMMUNOLOGIC NEGATIVE: 1
GASTROINTESTINAL NEGATIVE: 1
SINUS COMPLAINT: 1
EYES NEGATIVE: 1

## 2023-03-31 NOTE — PROGRESS NOTES
syringes and needles (Patient not taking: No sig reported), Disp: 3 each, Rfl: 3    Review of Systems   Constitutional: Negative. HENT:  Positive for postnasal drip and sneezing. Eyes: Negative. Respiratory: Negative. Cardiovascular: Negative. Gastrointestinal: Negative. Endocrine: Negative. Genitourinary: Negative. Musculoskeletal: Negative. Skin: Negative. Allergic/Immunologic: Negative. Neurological: Negative. Hematological: Negative. Psychiatric/Behavioral: Negative. There were no vitals filed for this visit. Physical Exam     No PE due to telephone encounter      Assessment & Plan:    1. Allergic rhinitis due to pollen, unspecified seasonality    - montelukast (SINGULAIR) 10 MG tablet; Take 1 tablet by mouth daily  Dispense: 30 tablet; Refill: 3  He will also start a Claritin or Zyrtec daily      Call for worsening symptoms. Greater than 50% counseling and/or coordination of care: This note was dictated using dragon voice recognition software. It has been proofread, but there may still exist voice recognition errors that the author did not detect. This virtual visit was conducted audio only  Total Time: 15.        Signed By: FLORECITA Thomas - ALEXANDER     March 31, 2023

## 2023-04-21 DIAGNOSIS — J30.1 ALLERGIC RHINITIS DUE TO POLLEN, UNSPECIFIED SEASONALITY: ICD-10-CM

## 2023-05-03 RX ORDER — MONTELUKAST SODIUM 10 MG/1
10 TABLET ORAL DAILY
Qty: 90 TABLET | Refills: 3 | Status: SHIPPED | OUTPATIENT
Start: 2023-05-03

## 2023-05-08 ENCOUNTER — TELEPHONE (OUTPATIENT)
Dept: SURGERY | Age: 58
End: 2023-05-08

## 2023-05-08 NOTE — TELEPHONE ENCOUNTER
Called to inform the pt that pre-assestment has until Friday to call with a time of arrival. He voiced understanding.    ----- Message from [address].  Claretha Bence sent at 5/7/2023  7:24 PM EDT -----  Regarding: Hernia surgery  Contact: [phone]  What time do I need to be there on Monday

## 2023-05-09 ENCOUNTER — TELEPHONE (OUTPATIENT)
Dept: SURGERY | Age: 58
End: 2023-05-09

## 2023-05-09 NOTE — TELEPHONE ENCOUNTER
Per pre-assessment patient may not have anyone to stay with him after surgery so he may need to reschedule. I called patient who then left me a message stating he will have someone and he wants to keep the surgery on the schedule. I did not cancel him.

## 2023-05-15 ENCOUNTER — ANESTHESIA (OUTPATIENT)
Dept: SURGERY | Age: 58
End: 2023-05-15
Payer: COMMERCIAL

## 2023-05-15 ENCOUNTER — ANESTHESIA EVENT (OUTPATIENT)
Dept: SURGERY | Age: 58
End: 2023-05-15
Payer: COMMERCIAL

## 2023-05-15 ENCOUNTER — HOSPITAL ENCOUNTER (OUTPATIENT)
Age: 58
Setting detail: OUTPATIENT SURGERY
Discharge: HOME | End: 2023-05-15
Attending: SURGERY | Admitting: SURGERY
Payer: COMMERCIAL

## 2023-05-15 VITALS
DIASTOLIC BLOOD PRESSURE: 97 MMHG | HEART RATE: 56 BPM | WEIGHT: 162.7 LBS | HEIGHT: 70 IN | BODY MASS INDEX: 23.29 KG/M2 | RESPIRATION RATE: 26 BRPM | TEMPERATURE: 97.5 F | SYSTOLIC BLOOD PRESSURE: 185 MMHG | OXYGEN SATURATION: 96 %

## 2023-05-15 DIAGNOSIS — K40.90 NON-RECURRENT UNILATERAL INGUINAL HERNIA WITHOUT OBSTRUCTION OR GANGRENE: Primary | ICD-10-CM

## 2023-05-15 PROCEDURE — 7100000001 HC PACU RECOVERY - ADDTL 15 MIN: Performed by: SURGERY

## 2023-05-15 PROCEDURE — 2500000003 HC RX 250 WO HCPCS: Performed by: NURSE ANESTHETIST, CERTIFIED REGISTERED

## 2023-05-15 PROCEDURE — 6360000002 HC RX W HCPCS: Performed by: REGISTERED NURSE

## 2023-05-15 PROCEDURE — 2709999900 HC NON-CHARGEABLE SUPPLY: Performed by: SURGERY

## 2023-05-15 PROCEDURE — 6360000002 HC RX W HCPCS: Performed by: SURGERY

## 2023-05-15 PROCEDURE — 6370000000 HC RX 637 (ALT 250 FOR IP): Performed by: ANESTHESIOLOGY

## 2023-05-15 PROCEDURE — 6360000002 HC RX W HCPCS: Performed by: ANESTHESIOLOGY

## 2023-05-15 PROCEDURE — 2500000003 HC RX 250 WO HCPCS: Performed by: SURGERY

## 2023-05-15 PROCEDURE — 6360000002 HC RX W HCPCS: Performed by: NURSE ANESTHETIST, CERTIFIED REGISTERED

## 2023-05-15 PROCEDURE — 3700000000 HC ANESTHESIA ATTENDED CARE: Performed by: SURGERY

## 2023-05-15 PROCEDURE — 2580000003 HC RX 258: Performed by: ANESTHESIOLOGY

## 2023-05-15 PROCEDURE — 7100000000 HC PACU RECOVERY - FIRST 15 MIN: Performed by: SURGERY

## 2023-05-15 PROCEDURE — 3600000013 HC SURGERY LEVEL 3 ADDTL 15MIN: Performed by: SURGERY

## 2023-05-15 PROCEDURE — 7100000011 HC PHASE II RECOVERY - ADDTL 15 MIN: Performed by: SURGERY

## 2023-05-15 PROCEDURE — 3700000001 HC ADD 15 MINUTES (ANESTHESIA): Performed by: SURGERY

## 2023-05-15 PROCEDURE — 3600000003 HC SURGERY LEVEL 3 BASE: Performed by: SURGERY

## 2023-05-15 PROCEDURE — 2500000003 HC RX 250 WO HCPCS: Performed by: REGISTERED NURSE

## 2023-05-15 PROCEDURE — 7100000010 HC PHASE II RECOVERY - FIRST 15 MIN: Performed by: SURGERY

## 2023-05-15 PROCEDURE — C1781 MESH (IMPLANTABLE): HCPCS | Performed by: SURGERY

## 2023-05-15 DEVICE — IMPLANTABLE DEVICE: Type: IMPLANTABLE DEVICE | Site: GROIN | Status: FUNCTIONAL

## 2023-05-15 RX ORDER — LIDOCAINE HYDROCHLORIDE 20 MG/ML
INJECTION, SOLUTION EPIDURAL; INFILTRATION; INTRACAUDAL; PERINEURAL PRN
Status: DISCONTINUED | OUTPATIENT
Start: 2023-05-15 | End: 2023-05-15 | Stop reason: SDUPTHER

## 2023-05-15 RX ORDER — OXYCODONE HYDROCHLORIDE 5 MG/1
5 TABLET ORAL
Status: COMPLETED | OUTPATIENT
Start: 2023-05-15 | End: 2023-05-15

## 2023-05-15 RX ORDER — HYDROCODONE BITARTRATE AND ACETAMINOPHEN 5; 325 MG/1; MG/1
1 TABLET ORAL EVERY 4 HOURS PRN
Qty: 20 TABLET | Refills: 0 | Status: SHIPPED | OUTPATIENT
Start: 2023-05-15 | End: 2023-05-20

## 2023-05-15 RX ORDER — SODIUM CHLORIDE 9 MG/ML
INJECTION, SOLUTION INTRAVENOUS PRN
Status: DISCONTINUED | OUTPATIENT
Start: 2023-05-15 | End: 2023-05-15 | Stop reason: HOSPADM

## 2023-05-15 RX ORDER — SODIUM CHLORIDE 0.9 % (FLUSH) 0.9 %
5-40 SYRINGE (ML) INJECTION EVERY 12 HOURS SCHEDULED
Status: DISCONTINUED | OUTPATIENT
Start: 2023-05-15 | End: 2023-05-15 | Stop reason: HOSPADM

## 2023-05-15 RX ORDER — GLYCOPYRROLATE 0.2 MG/ML
INJECTION INTRAMUSCULAR; INTRAVENOUS PRN
Status: DISCONTINUED | OUTPATIENT
Start: 2023-05-15 | End: 2023-05-15 | Stop reason: SDUPTHER

## 2023-05-15 RX ORDER — MIDAZOLAM HYDROCHLORIDE 2 MG/2ML
2 INJECTION, SOLUTION INTRAMUSCULAR; INTRAVENOUS
Status: COMPLETED | OUTPATIENT
Start: 2023-05-15 | End: 2023-05-15

## 2023-05-15 RX ORDER — ROCURONIUM BROMIDE 10 MG/ML
INJECTION, SOLUTION INTRAVENOUS PRN
Status: DISCONTINUED | OUTPATIENT
Start: 2023-05-15 | End: 2023-05-15 | Stop reason: SDUPTHER

## 2023-05-15 RX ORDER — FENTANYL CITRATE 50 UG/ML
100 INJECTION, SOLUTION INTRAMUSCULAR; INTRAVENOUS
Status: DISCONTINUED | OUTPATIENT
Start: 2023-05-15 | End: 2023-05-15 | Stop reason: HOSPADM

## 2023-05-15 RX ORDER — SODIUM CHLORIDE 0.9 % (FLUSH) 0.9 %
5-40 SYRINGE (ML) INJECTION PRN
Status: DISCONTINUED | OUTPATIENT
Start: 2023-05-15 | End: 2023-05-15 | Stop reason: HOSPADM

## 2023-05-15 RX ORDER — NEOSTIGMINE METHYLSULFATE 1 MG/ML
INJECTION, SOLUTION INTRAVENOUS PRN
Status: DISCONTINUED | OUTPATIENT
Start: 2023-05-15 | End: 2023-05-15 | Stop reason: SDUPTHER

## 2023-05-15 RX ORDER — LIDOCAINE HYDROCHLORIDE 10 MG/ML
1 INJECTION, SOLUTION INFILTRATION; PERINEURAL
Status: DISCONTINUED | OUTPATIENT
Start: 2023-05-15 | End: 2023-05-15 | Stop reason: HOSPADM

## 2023-05-15 RX ORDER — PROPOFOL 10 MG/ML
INJECTION, EMULSION INTRAVENOUS PRN
Status: DISCONTINUED | OUTPATIENT
Start: 2023-05-15 | End: 2023-05-15 | Stop reason: SDUPTHER

## 2023-05-15 RX ORDER — DEXAMETHASONE SODIUM PHOSPHATE 10 MG/ML
INJECTION INTRAMUSCULAR; INTRAVENOUS PRN
Status: DISCONTINUED | OUTPATIENT
Start: 2023-05-15 | End: 2023-05-15 | Stop reason: SDUPTHER

## 2023-05-15 RX ORDER — SUCCINYLCHOLINE CHLORIDE 20 MG/ML
INJECTION INTRAMUSCULAR; INTRAVENOUS PRN
Status: DISCONTINUED | OUTPATIENT
Start: 2023-05-15 | End: 2023-05-15 | Stop reason: SDUPTHER

## 2023-05-15 RX ORDER — ONDANSETRON 2 MG/ML
INJECTION INTRAMUSCULAR; INTRAVENOUS PRN
Status: DISCONTINUED | OUTPATIENT
Start: 2023-05-15 | End: 2023-05-15 | Stop reason: SDUPTHER

## 2023-05-15 RX ORDER — PROCHLORPERAZINE EDISYLATE 5 MG/ML
5 INJECTION INTRAMUSCULAR; INTRAVENOUS
Status: DISCONTINUED | OUTPATIENT
Start: 2023-05-15 | End: 2023-05-15 | Stop reason: HOSPADM

## 2023-05-15 RX ORDER — BUPIVACAINE HYDROCHLORIDE AND EPINEPHRINE 5; 5 MG/ML; UG/ML
INJECTION, SOLUTION EPIDURAL; INTRACAUDAL; PERINEURAL PRN
Status: DISCONTINUED | OUTPATIENT
Start: 2023-05-15 | End: 2023-05-15 | Stop reason: ALTCHOICE

## 2023-05-15 RX ORDER — SODIUM CHLORIDE, SODIUM LACTATE, POTASSIUM CHLORIDE, CALCIUM CHLORIDE 600; 310; 30; 20 MG/100ML; MG/100ML; MG/100ML; MG/100ML
INJECTION, SOLUTION INTRAVENOUS CONTINUOUS
Status: DISCONTINUED | OUTPATIENT
Start: 2023-05-15 | End: 2023-05-15 | Stop reason: HOSPADM

## 2023-05-15 RX ORDER — EPHEDRINE SULFATE/0.9% NACL/PF 50 MG/5 ML
SYRINGE (ML) INTRAVENOUS PRN
Status: DISCONTINUED | OUTPATIENT
Start: 2023-05-15 | End: 2023-05-15 | Stop reason: SDUPTHER

## 2023-05-15 RX ORDER — FENTANYL CITRATE 50 UG/ML
INJECTION, SOLUTION INTRAMUSCULAR; INTRAVENOUS PRN
Status: DISCONTINUED | OUTPATIENT
Start: 2023-05-15 | End: 2023-05-15 | Stop reason: SDUPTHER

## 2023-05-15 RX ADMIN — Medication 3 MG: at 09:07

## 2023-05-15 RX ADMIN — ONDANSETRON 4 MG: 2 INJECTION INTRAMUSCULAR; INTRAVENOUS at 08:14

## 2023-05-15 RX ADMIN — SODIUM CHLORIDE, SODIUM LACTATE, POTASSIUM CHLORIDE, AND CALCIUM CHLORIDE: 600; 310; 30; 20 INJECTION, SOLUTION INTRAVENOUS at 08:21

## 2023-05-15 RX ADMIN — OXYCODONE 5 MG: 5 TABLET ORAL at 09:50

## 2023-05-15 RX ADMIN — Medication 2000 MG: at 08:08

## 2023-05-15 RX ADMIN — SODIUM CHLORIDE, SODIUM LACTATE, POTASSIUM CHLORIDE, AND CALCIUM CHLORIDE: 600; 310; 30; 20 INJECTION, SOLUTION INTRAVENOUS at 07:42

## 2023-05-15 RX ADMIN — DEXAMETHASONE SODIUM PHOSPHATE 10 MG: 10 INJECTION INTRAMUSCULAR; INTRAVENOUS at 08:14

## 2023-05-15 RX ADMIN — SODIUM CHLORIDE, SODIUM LACTATE, POTASSIUM CHLORIDE, AND CALCIUM CHLORIDE: 600; 310; 30; 20 INJECTION, SOLUTION INTRAVENOUS at 06:40

## 2023-05-15 RX ADMIN — GLYCOPYRROLATE 0.4 MG: 0.2 INJECTION INTRAMUSCULAR; INTRAVENOUS at 09:07

## 2023-05-15 RX ADMIN — PROPOFOL 200 MG: 10 INJECTION, EMULSION INTRAVENOUS at 08:00

## 2023-05-15 RX ADMIN — FENTANYL CITRATE 100 MCG: 50 INJECTION, SOLUTION INTRAMUSCULAR; INTRAVENOUS at 08:00

## 2023-05-15 RX ADMIN — Medication 10 MG: at 08:50

## 2023-05-15 RX ADMIN — LIDOCAINE HYDROCHLORIDE 60 MG: 20 INJECTION, SOLUTION EPIDURAL; INFILTRATION; INTRACAUDAL; PERINEURAL at 08:00

## 2023-05-15 RX ADMIN — MIDAZOLAM HYDROCHLORIDE 2 MG: 1 INJECTION, SOLUTION INTRAMUSCULAR; INTRAVENOUS at 06:56

## 2023-05-15 RX ADMIN — ROCURONIUM BROMIDE 25 MG: 10 INJECTION, SOLUTION INTRAVENOUS at 08:12

## 2023-05-15 RX ADMIN — Medication 140 MG: at 08:00

## 2023-05-15 RX ADMIN — ROCURONIUM BROMIDE 5 MG: 10 INJECTION, SOLUTION INTRAVENOUS at 08:00

## 2023-05-15 ASSESSMENT — PAIN SCALES - GENERAL
PAINLEVEL_OUTOF10: 4
PAINLEVEL_OUTOF10: 4
PAINLEVEL_OUTOF10: 2

## 2023-05-15 ASSESSMENT — PAIN DESCRIPTION - FREQUENCY
FREQUENCY: CONTINUOUS
FREQUENCY: CONTINUOUS

## 2023-05-15 ASSESSMENT — PAIN DESCRIPTION - ONSET
ONSET: PROGRESSIVE
ONSET: GRADUAL

## 2023-05-15 ASSESSMENT — ENCOUNTER SYMPTOMS
GASTROINTESTINAL NEGATIVE: 1
RESPIRATORY NEGATIVE: 1

## 2023-05-15 ASSESSMENT — PAIN DESCRIPTION - ORIENTATION
ORIENTATION: RIGHT;LOWER
ORIENTATION: RIGHT;LOWER

## 2023-05-15 ASSESSMENT — PAIN DESCRIPTION - DESCRIPTORS
DESCRIPTORS: ACHING
DESCRIPTORS: ACHING

## 2023-05-15 ASSESSMENT — PAIN DESCRIPTION - PAIN TYPE
TYPE: SURGICAL PAIN
TYPE: SURGICAL PAIN

## 2023-05-15 ASSESSMENT — PAIN DESCRIPTION - LOCATION
LOCATION: ABDOMEN
LOCATION: ABDOMEN

## 2023-05-15 NOTE — DISCHARGE INSTRUCTIONS
Sherman Rice M.D.  [phone]    Instructions following Hernia Repair:    ACTIVITY:  Try to take a few short walks with help around the house later today. It is very important to take short walks to avoid blood clots and pneumonia. You may be light-headed or sleepy from anesthesia, so be careful going up and down stairs. Avoid any activity that involves lifting/pushing more than 30 pounds until your followup appointment  DIET:  Drink only clear, non-carbonated liquids when you first get home (sugar-free if you are diabetic), such as Gatorade, chicken broth, etc.  Later  you may resume a more normal diet, depending on how you feel  Using Miralax or other over the counter laxative is ok if you develop constipation    PAIN:  You will be given a prescription for pain medication. Try to take the pain medication with food, even a few crackers. You may also use Tylenol, Motrin, Advil, or Aleve instead of the prescription pain medication. Do no take Tylenol and the prescription pain medication within 6 hours of each other. URINARY RETENTION: If you are unable to empty your bladder within 6 hours after returning home, please go to your nearest Emergency Department or Urgent Care for urinary catheterization. WOUND CARE:  You may shower the day after surgery, unless instructed otherwise. It is not uncommon for the incisions to ooze or drain blood-tinged fluid. You may remove the clear dressing on the fifth postoperative day. Incisions will sometimes develop redness around them, up to the size of a quarter, as well as a hard lumpy feel. If this redness continues to get larger, please call the office. FOLLOW UP:  Your follow-up appointment is usually made when your surgery is arranged. Please call the office if you are not sure of this appointment. CALL THE DOCTOR IF:  You have a temperature higher than 101.5° Fahrenheit for more than 6 hours. You have severe nausea or vomiting.   You develop

## 2023-05-15 NOTE — ANESTHESIA POSTPROCEDURE EVALUATION
Department of Anesthesiology  Postprocedure Note    Patient: Cammie Mattson  MRN: [id number]  YOB: 1965  Date of evaluation: 5/15/2023      Procedure Summary     Date: 05/15/23 Room / Location: [facility] MAIN OR 03 / [facility] MAIN OR    Anesthesia Start: 0742 Anesthesia Stop: 7359    Procedure: RIGHT HERNIA INGUINAL REPAIR (Right: Groin) Diagnosis:       Inguinal hernia      (Inguinal hernia [K40.90])    Surgeons: Sanchez Lynch MD Responsible Provider: Javed Kennedy MD    Anesthesia Type: General ASA Status: 2          Anesthesia Type: General    Pradeep Phase I: Pradeep Score: 8    Pradeep Phase II: Pradeep Score: 10      Anesthesia Post Evaluation    Patient location during evaluation: PACU  Patient participation: complete - patient participated  Level of consciousness: awake and alert  Airway patency: patent  Nausea & Vomiting: no nausea and no vomiting  Complications: no  Cardiovascular status: hemodynamically stable  Respiratory status: acceptable, nonlabored ventilation and spontaneous ventilation  Hydration status: euvolemic  Comments: BP (!) 165/97   Pulse 56   Temp 97.5 °F (36.4 °C) (Temporal)   Resp 26   Ht 5' 10\" (1.778 m)   Wt 162 lb 11.2 oz (73.8 kg)   SpO2 96%   BMI 23.35 kg/m²     Multimodal analgesia pain management approach

## 2023-05-15 NOTE — H&P
Primary/Requesting provider: FLORECITA Terrazas CNP          Chief Complaint   Patient presents with    New Patient       NP - Right inguinal hernia          Patient is a 65 y.o. male who presents for evaluation of a RIGHT inguinal hernia, having been seen recently by urology and the hernia confirmed. He reports first noting the hernia in 2021 after his prostatectomy for cancer. He denies any notable change in size of the hernia, and her believes it is at least partially reducible. He notes \"gurgles\" at the site with movement or with a full bladder. He is not experiencing n/v, f/c, or alteration of his baseline bowel or bladder function. He is naware of any bulging and has no discomfort on the left side. He reports successful prostate surgery and appropriate PSA levels. Medications:          Current Outpatient Medications on File Prior to Visit   Medication Sig Dispense Refill    albuterol sulfate HFA (VENTOLIN HFA) 108 (90 Base) MCG/ACT inhaler Inhale 2 puffs into the lungs every 6 hours as needed for Wheezing 1 each 2    losartan (COZAAR) 50 MG tablet Take 1 tablet by mouth daily TAKE 1 TABLET BY MOUTH EVERY DAY Indications: high blood pressure 90 tablet 1    Papav-Phentolamine-Alprostadil 150- MG-MG-MCG SOLR Papaverine 30 mg/ml, Phentolamine 1.0 mg/ml, PGE 10mg/ml. Use 0.10 cc and increase by 0.05 cc up to 0.5 cc as needed. Please provide all syringes and needles (Patient not taking: No sig reported) 3 each 3      No current facility-administered medications on file prior to visit.          Allergies: No Known Allergies      Past History:  Past Medical History        Past Medical History:   Diagnosis Date    Environmental and seasonal allergies 3/29/2016    Essential hypertension with goal blood pressure less than 140/90 9/30/2016    Prostate cancer [facility])           Past Surgical History         Past Surgical History:   Procedure Laterality Date    COLONOSCOPY N/A 1/27/2017     COLONOSCOPY/

## 2023-05-15 NOTE — OP NOTE
Operative Report    Patient: Janice Drake MRN: [id number]     YOB: 1965  Age: 65 y.o. Sex: male       Date of Surgery: 5/15/2023     Preoperative Diagnosis: Inguinal hernia [K40.90]     Postoperative Diagnosis: same    Procedure:  right  Indirect Inguinal Hernia Repair with PerFix Plug     Anesthesia: General     EBL: minimal    Complications: none    Indications:  As outlined in History and Physical.    Procedure Details   Informed consent was obtained and the patient was brought to the operating room and placed on the table in a supine position. After successful induction of anesthesia, the groin was prepped and draped in the standard fashion. An incision was made in the suprainguinal region and was carried down through the subcutaneous tissues with cautery to the external oblique aponeurosis which was incised parallel to its fibers including opening through the external ring. One branched  nerve(s) was/were identified on the floor of the canal and were avoided throughout the procedure. The cord structures were mobilized at the level of the pubic tubercle and isolated with a penrose drain; there was significant chronic inflammatory changes/scarring of the cord. The cord was then dissected in its proximal third and an indirect sac was encountered; a cord lipoma was  identified and was also freed and reduced. The sac was dissected from the surrounding cord structures with cautery, tracing it through and then reducing it through the defect into the preperitoneal space taking care to avoid injury to the vas deferens. Again noted was significant chronic scarring and dense adherence of the cord to the sac. The sac was entered to aid in cord mobilization, and after complete dissection it was ligated below the opening and transected. Both the transected sac and lipoma were reduced.    A small PerFix plug was placed into the preperitoneal space through the internal ring and the inner petals of the

## 2023-05-15 NOTE — ANESTHESIA PRE PROCEDURE
Department of Anesthesiology  Preprocedure Note       Name:  Asha Pillai   Age:  65 y.o.  :  1965                                          MRN:  [id number]         Date:  5/15/2023      Surgeon: Keily Valdes):  Johnathon Simmons MD    Procedure: Procedure(s):  RIGHT HERNIA INGUINAL REPAIR    Medications prior to admission:   Prior to Admission medications    Medication Sig Start Date End Date Taking? Authorizing Provider   montelukast (SINGULAIR) 10 MG tablet Take 1 tablet by mouth daily 5/3/23   FLORECITA Ariza CNP   albuterol sulfate HFA (VENTOLIN HFA) 108 (90 Base) MCG/ACT inhaler Inhale 2 puffs into the lungs every 6 hours as needed for Wheezing 3/15/23   Kurt Mccray MD   losartan (COZAAR) 50 MG tablet Take 1 tablet by mouth daily TAKE 1 TABLET BY MOUTH EVERY DAY Indications: high blood pressure 23   FLORECITA Ariza CNP   Papav-Phentolamine-Alprostadil 150- MG-MG-MCG SOLR Papaverine 30 mg/ml, Phentolamine 1.0 mg/ml, PGE 10mg/ml. Use 0.10 cc and increase by 0.05 cc up to 0.5 cc as needed. Please provide all syringes and needles  Patient not taking: Reported on 2023   FLORECITA Parisi CNP       Current medications:    No current facility-administered medications for this visit. No current outpatient medications on file.      Facility-Administered Medications Ordered in Other Visits   Medication Dose Route Frequency Provider Last Rate Last Admin    ceFAZolin (ANCEF) 2000 mg in sterile water 20 mL IV syringe  2,000 mg IntraVENous On Call Johnathon Simmons MD        lidocaine 1 % injection 1 mL  1 mL IntraDERmal Once PRN Gloria Koch MD        fentaNYL (SUBLIMAZE) injection 100 mcg  100 mcg IntraVENous Once PRN Gloria Koch MD        lactated ringers IV soln infusion   IntraVENous Continuous Gloria Koch MD        sodium chloride flush 0.9 % injection 5-40 mL  5-40 mL IntraVENous 2 times per day Gloria Koch MD        sodium

## 2023-05-24 ENCOUNTER — OFFICE VISIT (OUTPATIENT)
Dept: SURGERY | Age: 58
End: 2023-05-24

## 2023-05-24 DIAGNOSIS — Z09 POSTOPERATIVE EXAMINATION: Primary | ICD-10-CM

## 2023-05-24 PROCEDURE — 99024 POSTOP FOLLOW-UP VISIT: CPT | Performed by: NURSE PRACTITIONER

## 2023-05-24 NOTE — PATIENT INSTRUCTIONS
-Follow up PRN  -No heavy lifting > 20 pounds x 2 weeks post op   -May return to work 5/31/2023   -Resume diet as tolerated  -Keep incisional area clean and dry  -Wear scrotal support PRN to prevent scrotal edema  -No tub bath or getting in pool until incisional area is completely healed

## 2023-06-09 DIAGNOSIS — J30.1 ALLERGIC RHINITIS DUE TO POLLEN, UNSPECIFIED SEASONALITY: ICD-10-CM

## 2023-06-12 RX ORDER — MONTELUKAST SODIUM 10 MG/1
10 TABLET ORAL DAILY
Qty: 90 TABLET | Refills: 3 | OUTPATIENT
Start: 2023-06-12

## 2023-07-25 ASSESSMENT — PATIENT HEALTH QUESTIONNAIRE - PHQ9
SUM OF ALL RESPONSES TO PHQ9 QUESTIONS 1 & 2: 0
SUM OF ALL RESPONSES TO PHQ QUESTIONS 1-9: 0
1. LITTLE INTEREST OR PLEASURE IN DOING THINGS: NOT AT ALL
2. FEELING DOWN, DEPRESSED OR HOPELESS: NOT AT ALL
SUM OF ALL RESPONSES TO PHQ QUESTIONS 1-9: 0
SUM OF ALL RESPONSES TO PHQ QUESTIONS 1-9: 0
2. FEELING DOWN, DEPRESSED OR HOPELESS: 0
SUM OF ALL RESPONSES TO PHQ QUESTIONS 1-9: 0
SUM OF ALL RESPONSES TO PHQ9 QUESTIONS 1 & 2: 0
1. LITTLE INTEREST OR PLEASURE IN DOING THINGS: 0

## 2023-07-28 ENCOUNTER — OFFICE VISIT (OUTPATIENT)
Dept: FAMILY MEDICINE CLINIC | Facility: CLINIC | Age: 58
End: 2023-07-28
Payer: COMMERCIAL

## 2023-07-28 VITALS
HEART RATE: 50 BPM | BODY MASS INDEX: 24.77 KG/M2 | OXYGEN SATURATION: 98 % | TEMPERATURE: 98.3 F | SYSTOLIC BLOOD PRESSURE: 156 MMHG | DIASTOLIC BLOOD PRESSURE: 86 MMHG | WEIGHT: 173 LBS | HEIGHT: 70 IN

## 2023-07-28 DIAGNOSIS — J30.1 ALLERGIC RHINITIS DUE TO POLLEN, UNSPECIFIED SEASONALITY: ICD-10-CM

## 2023-07-28 DIAGNOSIS — J30.89 ENVIRONMENTAL AND SEASONAL ALLERGIES: ICD-10-CM

## 2023-07-28 DIAGNOSIS — R06.00 DYSPNEA, UNSPECIFIED TYPE: ICD-10-CM

## 2023-07-28 DIAGNOSIS — I10 ESSENTIAL HYPERTENSION WITH GOAL BLOOD PRESSURE LESS THAN 140/90: Primary | ICD-10-CM

## 2023-07-28 DIAGNOSIS — Z11.4 ENCOUNTER FOR SCREENING FOR HIV: ICD-10-CM

## 2023-07-28 DIAGNOSIS — I10 ESSENTIAL HYPERTENSION WITH GOAL BLOOD PRESSURE LESS THAN 140/90: ICD-10-CM

## 2023-07-28 DIAGNOSIS — Z11.59 ENCOUNTER FOR HEPATITIS C SCREENING TEST FOR LOW RISK PATIENT: ICD-10-CM

## 2023-07-28 DIAGNOSIS — C61 PROSTATE CANCER (HCC): ICD-10-CM

## 2023-07-28 PROBLEM — K40.90 INGUINAL HERNIA: Status: RESOLVED | Noted: 2023-03-16 | Resolved: 2023-07-28

## 2023-07-28 LAB
ALBUMIN SERPL-MCNC: 3.9 G/DL (ref 3.5–5)
ALBUMIN/GLOB SERPL: 1.3 (ref 0.4–1.6)
ALP SERPL-CCNC: 60 U/L (ref 50–136)
ALT SERPL-CCNC: 24 U/L (ref 12–65)
ANION GAP SERPL CALC-SCNC: 6 MMOL/L (ref 2–11)
AST SERPL-CCNC: 19 U/L (ref 15–37)
BILIRUB SERPL-MCNC: 0.8 MG/DL (ref 0.2–1.1)
BUN SERPL-MCNC: 11 MG/DL (ref 6–23)
CALCIUM SERPL-MCNC: 8.8 MG/DL (ref 8.3–10.4)
CHLORIDE SERPL-SCNC: 108 MMOL/L (ref 101–110)
CHOLEST SERPL-MCNC: 137 MG/DL
CO2 SERPL-SCNC: 28 MMOL/L (ref 21–32)
CREAT SERPL-MCNC: 1.1 MG/DL (ref 0.8–1.5)
GLOBULIN SER CALC-MCNC: 3 G/DL (ref 2.8–4.5)
GLUCOSE SERPL-MCNC: 87 MG/DL (ref 65–100)
HCV AB SER QL: NONREACTIVE
HDLC SERPL-MCNC: 64 MG/DL (ref 40–60)
HDLC SERPL: 2.1
LDLC SERPL CALC-MCNC: 62.8 MG/DL
POTASSIUM SERPL-SCNC: 4 MMOL/L (ref 3.5–5.1)
PROT SERPL-MCNC: 6.9 G/DL (ref 6.3–8.2)
SODIUM SERPL-SCNC: 142 MMOL/L (ref 133–143)
TRIGL SERPL-MCNC: 51 MG/DL (ref 35–150)
VLDLC SERPL CALC-MCNC: 10.2 MG/DL (ref 6–23)

## 2023-07-28 PROCEDURE — 3078F DIAST BP <80 MM HG: CPT | Performed by: NURSE PRACTITIONER

## 2023-07-28 PROCEDURE — 3074F SYST BP LT 130 MM HG: CPT | Performed by: NURSE PRACTITIONER

## 2023-07-28 PROCEDURE — 99214 OFFICE O/P EST MOD 30 MIN: CPT | Performed by: NURSE PRACTITIONER

## 2023-07-28 RX ORDER — AMLODIPINE BESYLATE 5 MG/1
5 TABLET ORAL DAILY
Qty: 90 TABLET | Refills: 1 | Status: SHIPPED | OUTPATIENT
Start: 2023-07-28

## 2023-07-28 NOTE — PROGRESS NOTES
Georgette King is a 65 y.o. male who presents today for the following:  Chief Complaint   Patient presents with    Follow-up     Pt presents today for a routine F/U       No Known Allergies    Current Outpatient Medications   Medication Sig Dispense Refill    losartan (COZAAR) 50 MG tablet Take 1 tablet by mouth daily TAKE 1 TABLET BY MOUTH EVERY DAY Indications: high blood pressure 90 tablet 1    montelukast (SINGULAIR) 10 MG tablet Take 1 tablet by mouth daily 90 tablet 3    albuterol sulfate HFA (VENTOLIN HFA) 108 (90 Base) MCG/ACT inhaler Inhale 2 puffs into the lungs every 6 hours as needed for Wheezing 1 each 2    amLODIPine (NORVASC) 5 MG tablet Take 1 tablet by mouth daily 90 tablet 1    Papav-Phentolamine-Alprostadil 150- MG-MG-MCG SOLR Papaverine 30 mg/ml, Phentolamine 1.0 mg/ml, PGE 10mg/ml. Use 0.10 cc and increase by 0.05 cc up to 0.5 cc as needed. Please provide all syringes and needles (Patient not taking: Reported on 1/23/2023) 3 each 3     No current facility-administered medications for this visit.        Past Medical History:   Diagnosis Date    Environmental and seasonal allergies 3/29/2016    Essential hypertension with goal blood pressure less than 140/90 9/30/2016    Inguinal hernia 3/16/2023    Added automatically from request for surgery [id number]    Prostate cancer [facility])        Past Surgical History:   Procedure Laterality Date    COLONOSCOPY N/A 1/27/2017    COLONOSCOPY/  performed by Arnaldo Jorgensen MD at [facility] ENDOSCOPY    COLONOSCOPY N/A 1/26/2023    COLORECTAL CANCER SCREENING, NOT HIGH RISK performed by Janes Hinton MD at [address] [place] [address] FLX DX W/COLLJ [address] [facility] PFRMD  1/27/2017         CYST REMOVAL Right     wrist    HERNIA REPAIR Right 5/15/2023    RIGHT HERNIA INGUINAL REPAIR performed by Arnaldo Jorgensen MD at [place]  02/04/2021    SHOULDER ARTHROSCOPY Left             Social History     Tobacco Use    Smoking status: Never

## 2023-07-31 RX ORDER — MONTELUKAST SODIUM 10 MG/1
10 TABLET ORAL DAILY
Qty: 90 TABLET | Refills: 3 | Status: SHIPPED | OUTPATIENT
Start: 2023-07-31

## 2023-07-31 RX ORDER — LOSARTAN POTASSIUM 50 MG/1
50 TABLET ORAL DAILY
Qty: 90 TABLET | Refills: 1 | Status: SHIPPED | OUTPATIENT
Start: 2023-07-31

## 2023-07-31 RX ORDER — ALBUTEROL SULFATE 90 UG/1
2 AEROSOL, METERED RESPIRATORY (INHALATION) EVERY 6 HOURS PRN
Qty: 1 EACH | Refills: 2 | Status: SHIPPED | OUTPATIENT
Start: 2023-07-31

## 2023-07-31 ASSESSMENT — ENCOUNTER SYMPTOMS
GASTROINTESTINAL NEGATIVE: 1
ALLERGIC/IMMUNOLOGIC NEGATIVE: 1
SHORTNESS OF BREATH: 0
EYES NEGATIVE: 1
WHEEZING: 0
COUGH: 0

## 2023-08-07 NOTE — PROGRESS NOTES
Problem: Falls - Risk of  Goal: *Absence of Falls  Description: Document Kenny Kothari Fall Risk and appropriate interventions in the flowsheet.   Outcome: Progressing Towards Goal  Note: Fall Risk Interventions:                                Problem: Patient Education: Go to Patient Education Activity  Goal: Patient/Family Education  Outcome: Progressing Towards Goal     Problem: Pain  Goal: *Control of Pain  Outcome: Progressing Towards Goal  Goal: *PALLIATIVE CARE:  Alleviation of Pain  Outcome: Progressing Towards Goal Quality 110: Preventive Care And Screening: Influenza Immunization: Influenza Immunization not Administered because Patient Refused. Detail Level: Detailed

## 2023-08-11 ENCOUNTER — OFFICE VISIT (OUTPATIENT)
Dept: FAMILY MEDICINE CLINIC | Facility: CLINIC | Age: 58
End: 2023-08-11
Payer: COMMERCIAL

## 2023-08-11 VITALS
DIASTOLIC BLOOD PRESSURE: 80 MMHG | BODY MASS INDEX: 24.48 KG/M2 | TEMPERATURE: 98.3 F | HEIGHT: 70 IN | WEIGHT: 171 LBS | SYSTOLIC BLOOD PRESSURE: 132 MMHG | HEART RATE: 76 BPM | OXYGEN SATURATION: 97 %

## 2023-08-11 DIAGNOSIS — I10 ESSENTIAL HYPERTENSION WITH GOAL BLOOD PRESSURE LESS THAN 140/90: ICD-10-CM

## 2023-08-11 PROCEDURE — 3079F DIAST BP 80-89 MM HG: CPT | Performed by: NURSE PRACTITIONER

## 2023-08-11 PROCEDURE — 3075F SYST BP GE 130 - 139MM HG: CPT | Performed by: NURSE PRACTITIONER

## 2023-08-11 PROCEDURE — 99213 OFFICE O/P EST LOW 20 MIN: CPT | Performed by: NURSE PRACTITIONER

## 2023-08-11 RX ORDER — LOSARTAN POTASSIUM 50 MG/1
50 TABLET ORAL DAILY
Qty: 90 TABLET | Refills: 2 | Status: SHIPPED | OUTPATIENT
Start: 2023-08-11

## 2023-08-11 RX ORDER — AMLODIPINE BESYLATE 5 MG/1
5 TABLET ORAL DAILY
Qty: 90 TABLET | Refills: 2 | Status: SHIPPED | OUTPATIENT
Start: 2023-08-11

## 2023-08-11 ASSESSMENT — PATIENT HEALTH QUESTIONNAIRE - PHQ9
SUM OF ALL RESPONSES TO PHQ QUESTIONS 1-9: 0
SUM OF ALL RESPONSES TO PHQ9 QUESTIONS 1 & 2: 0
1. LITTLE INTEREST OR PLEASURE IN DOING THINGS: 0
2. FEELING DOWN, DEPRESSED OR HOPELESS: 0
SUM OF ALL RESPONSES TO PHQ QUESTIONS 1-9: 0

## 2023-08-11 ASSESSMENT — ENCOUNTER SYMPTOMS: RESPIRATORY NEGATIVE: 1

## 2023-08-11 NOTE — PROGRESS NOTES
Status: He is alert and oriented to person, place, and time. Psychiatric:         Mood and Affect: Mood normal.         Behavior: Behavior normal.         Thought Content: Thought content normal.         Judgment: Judgment normal.        1. Essential hypertension with goal blood pressure less than 140/90  -     amLODIPine (NORVASC) 5 MG tablet; Take 1 tablet by mouth daily, Disp-90 tablet, R-2Normal  -     losartan (COZAAR) 50 MG tablet; Take 1 tablet by mouth daily TAKE 1 TABLET BY MOUTH EVERY DAY Indications: high blood pressure, Disp-90 tablet, R-2Normal  Pt blood pressure controlled. Lipid panel excellent. He denies family hx of CAD. Continue heart healthy diet and exercise. No side effects or concerns with the medication. Patient informed, we will call with blood work results within one week. If you have not heard regarding results in over a week, please contact office. You can also review results on [employer]. Follow-up and Dispositions    Return in about 6 months (around 2/11/2024) for Routine Visit.          Hilary Man, FLORECITA - CNP

## 2023-08-11 NOTE — PATIENT INSTRUCTIONS
Patient Education        Learning About the Mediterranean Diet  What is the [address]? The Mediterranean diet is a style of eating rather than a diet plan. It features foods eaten in [facility], [place], [place], and other countries along the [facility]. It emphasizes eating foods like fish, fruits, vegetables, beans, high-fiber breads and whole grains, nuts, and olive oil. This style of eating includes limited red meat, cheese, and sweets. Why choose the Mediterranean diet? A Mediterranean-style diet may improve heart health. It contains more fat than other heart-healthy diets. But the fats are mainly from nuts, unsaturated oils (such as fish oils and olive oil), and certain nut or seed oils (such as canola, soybean, or flaxseed oil). These fats may help protect the heart and blood vessels. How can you get started on the Mediterranean diet? Here are some things you can do to switch to a more Mediterranean way of eating. What to eat  Eat a variety of fruits and vegetables each day, such as grapes, blueberries, tomatoes, broccoli, peppers, figs, olives, spinach, eggplant, beans, lentils, and chickpeas. Eat a variety of whole-grain foods each day, such as oats, brown rice, and whole wheat bread, pasta, and couscous. Eat fish at least 2 times a week. Try tuna, salmon, mackerel, lake trout, herring, or sardines. Eat moderate amounts of low-fat dairy products, such as milk, cheese, or yogurt. Eat moderate amounts of poultry and eggs. Choose healthy (unsaturated) fats, such as nuts, olive oil, and certain nut or seed oils like canola, soybean, and flaxseed. Limit unhealthy (saturated) fats, such as butter, palm oil, and coconut oil. And limit fats found in animal products, such as meat and dairy products made with whole milk. Try to eat red meat only a few times a month in very small amounts. Limit sweets and desserts to only a few times a week.  This includes sugar-sweetened

## 2024-01-16 DIAGNOSIS — C61 MALIGNANT NEOPLASM OF PROSTATE (HCC): Primary | ICD-10-CM

## 2024-01-16 RX ORDER — SILDENAFIL 100 MG/1
100 TABLET, FILM COATED ORAL DAILY PRN
Qty: 30 TABLET | Refills: 3 | Status: SHIPPED | OUTPATIENT
Start: 2024-01-16

## 2024-02-01 ENCOUNTER — OFFICE VISIT (OUTPATIENT)
Dept: UROLOGY | Age: 59
End: 2024-02-01
Payer: COMMERCIAL

## 2024-02-01 DIAGNOSIS — C61 MALIGNANT NEOPLASM OF PROSTATE (HCC): ICD-10-CM

## 2024-02-01 DIAGNOSIS — N52.9 ERECTILE DYSFUNCTION, UNSPECIFIED ERECTILE DYSFUNCTION TYPE: Primary | ICD-10-CM

## 2024-02-01 LAB
BILIRUBIN, URINE, POC: NEGATIVE
BLOOD URINE, POC: NORMAL
GLUCOSE URINE, POC: NEGATIVE
KETONES, URINE, POC: NEGATIVE
LEUKOCYTE ESTERASE, URINE, POC: NEGATIVE
NITRITE, URINE, POC: NEGATIVE
PH, URINE, POC: 6.5 (ref 4.6–8)
PROTEIN,URINE, POC: NEGATIVE
SPECIFIC GRAVITY, URINE, POC: 1.02 (ref 1–1.03)
URINALYSIS CLARITY, POC: NORMAL
URINALYSIS COLOR, POC: NORMAL
UROBILINOGEN, POC: NORMAL

## 2024-02-01 PROCEDURE — 81003 URINALYSIS AUTO W/O SCOPE: CPT | Performed by: NURSE PRACTITIONER

## 2024-02-01 PROCEDURE — 99214 OFFICE O/P EST MOD 30 MIN: CPT | Performed by: NURSE PRACTITIONER

## 2024-02-01 RX ORDER — TADALAFIL 20 MG/1
20 TABLET ORAL
Qty: 30 TABLET | Refills: 1 | Status: SHIPPED | OUTPATIENT
Start: 2024-02-01

## 2024-02-01 ASSESSMENT — ENCOUNTER SYMPTOMS
BACK PAIN: 0
NAUSEA: 0

## 2024-02-01 NOTE — PROGRESS NOTES
Other Mother         hemorrhage       Review of Systems  Constitutional:   Negative for fever.  GI:  Negative for nausea.  Musculoskeletal:  Negative for back pain.      Urinalysis  UA - Dipstick  Results for orders placed or performed in visit on 02/01/24   AMB POC URINALYSIS DIP STICK AUTO W/O MICRO   Result Value Ref Range    Color (UA POC)      Clarity (UA POC)      Glucose, Urine, POC Negative     Bilirubin, Urine, POC Negative     KETONES, Urine, POC Negative     Specific Gravity, Urine, POC 1.025 1.001 - 1.035    Blood (UA POC) Moderate     pH, Urine, POC 6.5 4.6 - 8.0    Protein, Urine, POC Negative     Urobilinogen, POC 4 mg/dL     Nitrite, Urine, POC Negative     Leukocyte Esterase, Urine, POC Negative        UA - Micro  WBC - 0  RBC - 0  Bacteria - 0  Epith - 0    PHYSICAL EXAM    General appearance - well appearing and in no distress  Mental status - alert, oriented to person, place, and time  Neck - supple, no significant adenopathy  Chest/Lung-  Quiet, even and easy respiratory effort without use of accessory muscles  Skin - normal coloration and turgor, no rashes      Assessment and Plan    ICD-10-CM    1. Erectile dysfunction, unspecified erectile dysfunction type  N52.9 AMB POC URINALYSIS DIP STICK AUTO W/O MICRO      2. Malignant neoplasm of prostate (HCC)  C61 AMB POC URINALYSIS DIP STICK AUTO W/O MICRO     PSA, ultrasensitive     PSA, ultrasensitive          Prostate CA- PSA today. Will contact w results.     ED- he opts to try Cialis 20 mg PO q 72 hr PRN pain. Risks, benefits, and alternatives reviewed.    Follow up pending lab results. Advised to call sooner if needed.    FLORECITA Burnham - ALEXANDER Sims is supervising physician today and he approves plan of care.

## 2024-02-03 LAB — PSA SERPL DL<=0.01 NG/ML-MCNC: <0.006 NG/ML (ref 0–4)

## 2024-02-09 ENCOUNTER — OFFICE VISIT (OUTPATIENT)
Dept: FAMILY MEDICINE CLINIC | Facility: CLINIC | Age: 59
End: 2024-02-09
Payer: COMMERCIAL

## 2024-02-09 VITALS
OXYGEN SATURATION: 98 % | TEMPERATURE: 98.5 F | DIASTOLIC BLOOD PRESSURE: 78 MMHG | HEART RATE: 62 BPM | WEIGHT: 182 LBS | SYSTOLIC BLOOD PRESSURE: 138 MMHG | BODY MASS INDEX: 26.11 KG/M2

## 2024-02-09 DIAGNOSIS — J30.1 ALLERGIC RHINITIS DUE TO POLLEN, UNSPECIFIED SEASONALITY: ICD-10-CM

## 2024-02-09 DIAGNOSIS — I10 ESSENTIAL HYPERTENSION WITH GOAL BLOOD PRESSURE LESS THAN 140/90: ICD-10-CM

## 2024-02-09 DIAGNOSIS — J00 ACUTE RHINITIS: ICD-10-CM

## 2024-02-09 DIAGNOSIS — I10 ESSENTIAL HYPERTENSION WITH GOAL BLOOD PRESSURE LESS THAN 140/90: Primary | ICD-10-CM

## 2024-02-09 LAB
ANION GAP SERPL CALC-SCNC: 4 MMOL/L (ref 2–11)
BASOPHILS # BLD: 0.1 K/UL (ref 0–0.2)
BASOPHILS NFR BLD: 1 % (ref 0–2)
BUN SERPL-MCNC: 11 MG/DL (ref 6–23)
CALCIUM SERPL-MCNC: 9.1 MG/DL (ref 8.3–10.4)
CHLORIDE SERPL-SCNC: 109 MMOL/L (ref 103–113)
CO2 SERPL-SCNC: 29 MMOL/L (ref 21–32)
CREAT SERPL-MCNC: 1 MG/DL (ref 0.8–1.5)
DIFFERENTIAL METHOD BLD: ABNORMAL
EOSINOPHIL # BLD: 0.2 K/UL (ref 0–0.8)
EOSINOPHIL NFR BLD: 4 % (ref 0.5–7.8)
ERYTHROCYTE [DISTWIDTH] IN BLOOD BY AUTOMATED COUNT: 12.6 % (ref 11.9–14.6)
GLUCOSE SERPL-MCNC: 89 MG/DL (ref 65–100)
HCT VFR BLD AUTO: 40.9 % (ref 41.1–50.3)
HGB BLD-MCNC: 14.1 G/DL (ref 13.6–17.2)
IMM GRANULOCYTES # BLD AUTO: 0 K/UL (ref 0–0.5)
IMM GRANULOCYTES NFR BLD AUTO: 0 % (ref 0–5)
LYMPHOCYTES # BLD: 1.5 K/UL (ref 0.5–4.6)
LYMPHOCYTES NFR BLD: 32 % (ref 13–44)
MCH RBC QN AUTO: 32.8 PG (ref 26.1–32.9)
MCHC RBC AUTO-ENTMCNC: 34.5 G/DL (ref 31.4–35)
MCV RBC AUTO: 95.1 FL (ref 82–102)
MONOCYTES # BLD: 0.5 K/UL (ref 0.1–1.3)
MONOCYTES NFR BLD: 10 % (ref 4–12)
NEUTS SEG # BLD: 2.5 K/UL (ref 1.7–8.2)
NEUTS SEG NFR BLD: 53 % (ref 43–78)
NRBC # BLD: 0 K/UL (ref 0–0.2)
PLATELET # BLD AUTO: 207 K/UL (ref 150–450)
PMV BLD AUTO: 11.7 FL (ref 9.4–12.3)
POTASSIUM SERPL-SCNC: 3.9 MMOL/L (ref 3.5–5.1)
RBC # BLD AUTO: 4.3 M/UL (ref 4.23–5.6)
SODIUM SERPL-SCNC: 142 MMOL/L (ref 136–146)
WBC # BLD AUTO: 4.8 K/UL (ref 4.3–11.1)

## 2024-02-09 PROCEDURE — 3075F SYST BP GE 130 - 139MM HG: CPT | Performed by: NURSE PRACTITIONER

## 2024-02-09 PROCEDURE — 3078F DIAST BP <80 MM HG: CPT | Performed by: NURSE PRACTITIONER

## 2024-02-09 PROCEDURE — 99214 OFFICE O/P EST MOD 30 MIN: CPT | Performed by: NURSE PRACTITIONER

## 2024-02-09 RX ORDER — MONTELUKAST SODIUM 10 MG/1
10 TABLET ORAL DAILY
Qty: 90 TABLET | Refills: 3 | Status: SHIPPED | OUTPATIENT
Start: 2024-02-09

## 2024-02-09 RX ORDER — AMLODIPINE BESYLATE 5 MG/1
5 TABLET ORAL DAILY
Qty: 90 TABLET | Refills: 3 | Status: SHIPPED | OUTPATIENT
Start: 2024-02-09

## 2024-02-09 RX ORDER — LOSARTAN POTASSIUM 50 MG/1
50 TABLET ORAL DAILY
Qty: 90 TABLET | Refills: 2 | Status: SHIPPED | OUTPATIENT
Start: 2024-02-09

## 2024-02-09 RX ORDER — FLUTICASONE PROPIONATE 50 MCG
2 SPRAY, SUSPENSION (ML) NASAL DAILY
Qty: 16 G | Refills: 0 | Status: SHIPPED | OUTPATIENT
Start: 2024-02-09

## 2024-02-09 ASSESSMENT — PATIENT HEALTH QUESTIONNAIRE - PHQ9
2. FEELING DOWN, DEPRESSED OR HOPELESS: 0
1. LITTLE INTEREST OR PLEASURE IN DOING THINGS: 0
SUM OF ALL RESPONSES TO PHQ QUESTIONS 1-9: 0
SUM OF ALL RESPONSES TO PHQ9 QUESTIONS 1 & 2: 0
SUM OF ALL RESPONSES TO PHQ QUESTIONS 1-9: 0

## 2024-02-09 ASSESSMENT — ENCOUNTER SYMPTOMS
COUGH: 0
SORE THROAT: 1
SHORTNESS OF BREATH: 0
PHOTOPHOBIA: 0

## 2024-02-09 NOTE — PATIENT INSTRUCTIONS
Patient Education        Elevated Blood Pressure: Care Instructions  Your Care Instructions    Blood pressure is a measure of how hard the blood pushes against the walls of your arteries. It's normal for blood pressure to go up and down throughout the day. But if it stays up over time, you have high blood pressure.  Two numbers tell you your blood pressure. The first number is the systolic pressure. It shows how hard the blood pushes when your heart is pumping. The second number is the diastolic pressure. It shows how hard the blood pushes between heartbeats, when your heart is relaxed and filling with blood. An ideal blood pressure in adults is less than 120/80 (say \"120 over 80\"). High blood pressure is 140/90 or higher. You have high blood pressure if your top number is 140 or higher or your bottom number is 90 or higher, or both.  The main test for high blood pressure is simple, fast, and painless. To diagnose high blood pressure, your doctor will test your blood pressure at different times. After testing your blood pressure, your doctor may ask you to test it again when you are home.  If you are diagnosed with high blood pressure, you can work with your doctor to make a long-term plan to manage it.  Follow-up care is a key part of your treatment and safety. Be sure to make and go to all appointments, and call your doctor if you are having problems. It's also a good idea to know your test results and keep a list of the medicines you take.  How can you care for yourself at home?  Do not smoke. Smoking increases your risk for heart attack and stroke. If you need help quitting, talk to your doctor about stop-smoking programs and medicines. These can increase your chances of quitting for good.  Stay at a healthy weight.  Try to limit how much sodium you eat to less than 2,300 milligrams (mg) a day. Your doctor may ask you to try to eat less than 1,500 mg a day.  Be physically active. Get at least 30 minutes of

## 2024-02-09 NOTE — PROGRESS NOTES
Jason Ortiz is a 58 y.o. male who presents today for the following:  Chief Complaint   Patient presents with    Follow-up     Pt presents today for FU. Pt has been having some sinus drainage x 2 wks.        No Known Allergies    Current Outpatient Medications   Medication Sig Dispense Refill    fluticasone (FLONASE) 50 MCG/ACT nasal spray 2 sprays by Each Nostril route daily 16 g 0    montelukast (SINGULAIR) 10 MG tablet Take 1 tablet by mouth daily 90 tablet 3    losartan (COZAAR) 50 MG tablet Take 1 tablet by mouth daily TAKE 1 TABLET BY MOUTH EVERY DAY Indications: high blood pressure 90 tablet 2    amLODIPine (NORVASC) 5 MG tablet Take 1 tablet by mouth daily 90 tablet 3    tadalafil (CIALIS) 20 MG tablet Take 1 tablet by mouth every 72 hours as needed for Erectile Dysfunction 30 tablet 1    sildenafil (VIAGRA) 100 MG tablet Take 1 tablet by mouth daily as needed for Erectile Dysfunction 30 tablet 3    albuterol sulfate HFA (VENTOLIN HFA) 108 (90 Base) MCG/ACT inhaler Inhale 2 puffs into the lungs every 6 hours as needed for Wheezing 1 each 2     No current facility-administered medications for this visit.       Past Medical History:   Diagnosis Date    Environmental and seasonal allergies 3/29/2016    Essential hypertension with goal blood pressure less than 140/90 9/30/2016    Inguinal hernia 3/16/2023    Added automatically from request for surgery [id number]    Prostate cancer (HCC)        Past Surgical History:   Procedure Laterality Date    COLONOSCOPY N/A 1/27/2017    COLONOSCOPY/  performed by Mesfin Encarnacion MD at [facility] ENDOSCOPY    COLONOSCOPY N/A 1/26/2023    COLORECTAL CANCER SCREENING, NOT HIGH RISK [place] [facility] [place]    COLONOSCOPY FLX DX W/COLLJ SPEC WHEN PFRMD  1/27/2017         CYST REMOVAL Right     wrist    HERNIA REPAIR Right 5/15/2023    RIGHT HERNIA INGUINAL REPAIR [place] Mesfni [place] [facility] [place]    PROSTATECTOMY  02/04/2021    SHOULDER

## 2024-03-14 RX ORDER — TADALAFIL 20 MG/1
20 TABLET ORAL
Qty: 30 TABLET | Refills: 1 | Status: SHIPPED | OUTPATIENT
Start: 2024-03-14

## 2024-04-06 DIAGNOSIS — R06.00 DYSPNEA, UNSPECIFIED TYPE: ICD-10-CM

## 2024-04-08 ENCOUNTER — TELEPHONE (OUTPATIENT)
Dept: FAMILY MEDICINE CLINIC | Facility: CLINIC | Age: 59
End: 2024-04-08

## 2024-04-08 RX ORDER — ALBUTEROL SULFATE 90 UG/1
2 AEROSOL, METERED RESPIRATORY (INHALATION) EVERY 6 HOURS PRN
Qty: 1 EACH | Refills: 2 | Status: SHIPPED | OUTPATIENT
Start: 2024-04-08

## 2024-04-08 NOTE — TELEPHONE ENCOUNTER
Pharmacy called and states the rx sent for the ventolin is not covered by his ins. Wanted to know if they could put it through for the proair or proventil instead.

## 2024-08-06 SDOH — ECONOMIC STABILITY: FOOD INSECURITY: WITHIN THE PAST 12 MONTHS, THE FOOD YOU BOUGHT JUST DIDN'T LAST AND YOU DIDN'T HAVE MONEY TO GET MORE.: NEVER TRUE

## 2024-08-06 SDOH — ECONOMIC STABILITY: FOOD INSECURITY: WITHIN THE PAST 12 MONTHS, YOU WORRIED THAT YOUR FOOD WOULD RUN OUT BEFORE YOU GOT MONEY TO BUY MORE.: NEVER TRUE

## 2024-08-06 SDOH — ECONOMIC STABILITY: INCOME INSECURITY: HOW HARD IS IT FOR YOU TO PAY FOR THE VERY BASICS LIKE FOOD, HOUSING, MEDICAL CARE, AND HEATING?: NOT VERY HARD

## 2024-08-09 ENCOUNTER — OFFICE VISIT (OUTPATIENT)
Dept: FAMILY MEDICINE CLINIC | Facility: CLINIC | Age: 59
End: 2024-08-09
Payer: COMMERCIAL

## 2024-08-09 VITALS
WEIGHT: 178 LBS | BODY MASS INDEX: 25.48 KG/M2 | OXYGEN SATURATION: 98 % | HEIGHT: 70 IN | TEMPERATURE: 97.7 F | SYSTOLIC BLOOD PRESSURE: 132 MMHG | DIASTOLIC BLOOD PRESSURE: 84 MMHG | HEART RATE: 66 BPM

## 2024-08-09 DIAGNOSIS — I10 ESSENTIAL HYPERTENSION WITH GOAL BLOOD PRESSURE LESS THAN 140/90: ICD-10-CM

## 2024-08-09 DIAGNOSIS — J00 ACUTE RHINITIS: ICD-10-CM

## 2024-08-09 DIAGNOSIS — J30.1 ALLERGIC RHINITIS DUE TO POLLEN, UNSPECIFIED SEASONALITY: ICD-10-CM

## 2024-08-09 DIAGNOSIS — J30.89 ENVIRONMENTAL AND SEASONAL ALLERGIES: ICD-10-CM

## 2024-08-09 DIAGNOSIS — R06.00 DYSPNEA, UNSPECIFIED TYPE: ICD-10-CM

## 2024-08-09 DIAGNOSIS — C61 PROSTATE CANCER (HCC): ICD-10-CM

## 2024-08-09 DIAGNOSIS — I10 ESSENTIAL HYPERTENSION WITH GOAL BLOOD PRESSURE LESS THAN 140/90: Primary | ICD-10-CM

## 2024-08-09 LAB
ALBUMIN SERPL-MCNC: 4 G/DL (ref 3.5–5)
ALBUMIN/GLOB SERPL: 1.5 (ref 1–1.9)
ALP SERPL-CCNC: 62 U/L (ref 40–129)
ALT SERPL-CCNC: 25 U/L (ref 12–65)
ANION GAP SERPL CALC-SCNC: 9 MMOL/L (ref 9–18)
AST SERPL-CCNC: 28 U/L (ref 15–37)
BILIRUB SERPL-MCNC: 0.3 MG/DL (ref 0–1.2)
BUN SERPL-MCNC: 10 MG/DL (ref 6–23)
CALCIUM SERPL-MCNC: 9 MG/DL (ref 8.8–10.2)
CHLORIDE SERPL-SCNC: 103 MMOL/L (ref 98–107)
CHOLEST SERPL-MCNC: 160 MG/DL (ref 0–200)
CO2 SERPL-SCNC: 27 MMOL/L (ref 20–28)
CREAT SERPL-MCNC: 1.06 MG/DL (ref 0.8–1.3)
GLOBULIN SER CALC-MCNC: 2.7 G/DL (ref 2.3–3.5)
GLUCOSE SERPL-MCNC: 94 MG/DL (ref 70–99)
HDLC SERPL-MCNC: 53 MG/DL (ref 40–60)
HDLC SERPL: 3 (ref 0–5)
LDLC SERPL CALC-MCNC: 79 MG/DL (ref 0–100)
POTASSIUM SERPL-SCNC: 4.1 MMOL/L (ref 3.5–5.1)
PROT SERPL-MCNC: 6.8 G/DL (ref 6.3–8.2)
SODIUM SERPL-SCNC: 139 MMOL/L (ref 136–145)
TRIGL SERPL-MCNC: 143 MG/DL (ref 0–150)
VLDLC SERPL CALC-MCNC: 29 MG/DL (ref 6–23)

## 2024-08-09 PROCEDURE — 99214 OFFICE O/P EST MOD 30 MIN: CPT | Performed by: NURSE PRACTITIONER

## 2024-08-09 PROCEDURE — 3075F SYST BP GE 130 - 139MM HG: CPT | Performed by: NURSE PRACTITIONER

## 2024-08-09 PROCEDURE — 3079F DIAST BP 80-89 MM HG: CPT | Performed by: NURSE PRACTITIONER

## 2024-08-09 RX ORDER — AMLODIPINE BESYLATE 5 MG/1
5 TABLET ORAL DAILY
Qty: 90 TABLET | Refills: 3 | Status: SHIPPED | OUTPATIENT
Start: 2024-08-09

## 2024-08-09 RX ORDER — MONTELUKAST SODIUM 10 MG/1
10 TABLET ORAL NIGHTLY
Qty: 90 TABLET | Refills: 3 | Status: SHIPPED | OUTPATIENT
Start: 2024-08-09

## 2024-08-09 RX ORDER — LOSARTAN POTASSIUM 50 MG/1
50 TABLET ORAL DAILY
Qty: 90 TABLET | Refills: 2 | Status: SHIPPED | OUTPATIENT
Start: 2024-08-09

## 2024-08-09 RX ORDER — ALBUTEROL SULFATE 90 UG/1
2 AEROSOL, METERED RESPIRATORY (INHALATION) EVERY 6 HOURS PRN
Qty: 1 EACH | Refills: 2 | Status: SHIPPED | OUTPATIENT
Start: 2024-08-09

## 2024-08-09 RX ORDER — FLUTICASONE PROPIONATE 50 MCG
2 SPRAY, SUSPENSION (ML) NASAL DAILY
Qty: 16 G | Refills: 11 | Status: SHIPPED | OUTPATIENT
Start: 2024-08-09

## 2024-08-09 ASSESSMENT — ENCOUNTER SYMPTOMS: SHORTNESS OF BREATH: 0

## 2024-08-09 ASSESSMENT — PATIENT HEALTH QUESTIONNAIRE - PHQ9
1. LITTLE INTEREST OR PLEASURE IN DOING THINGS: NOT AT ALL
SUM OF ALL RESPONSES TO PHQ QUESTIONS 1-9: 0
SUM OF ALL RESPONSES TO PHQ9 QUESTIONS 1 & 2: 0
SUM OF ALL RESPONSES TO PHQ QUESTIONS 1-9: 0
2. FEELING DOWN, DEPRESSED OR HOPELESS: NOT AT ALL

## 2024-08-09 NOTE — PROGRESS NOTES
Jason Ortiz is a 58 y.o. male who presents today for the following:  Chief Complaint   Patient presents with    Hypertension     6 month follow up       No Known Allergies    Current Outpatient Medications   Medication Sig Dispense Refill    amLODIPine (NORVASC) 5 MG tablet Take 1 tablet by mouth daily 90 tablet 3    losartan (COZAAR) 50 MG tablet Take 1 tablet by mouth daily TAKE 1 TABLET BY MOUTH EVERY DAY Indications: high blood pressure 90 tablet 2    montelukast (SINGULAIR) 10 MG tablet Take 1 tablet by mouth nightly 90 tablet 3    fluticasone (FLONASE) 50 MCG/ACT nasal spray 2 sprays by Each Nostril route daily 16 g 11    albuterol sulfate HFA (VENTOLIN HFA) 108 (90 Base) MCG/ACT inhaler Inhale 2 puffs into the lungs every 6 hours as needed for Wheezing 1 each 2    tadalafil (CIALIS) 20 MG tablet Take 1 tablet by mouth every 72 hours as needed for Erectile Dysfunction 30 tablet 1    sildenafil (VIAGRA) 100 MG tablet Take 1 tablet by mouth daily as needed for Erectile Dysfunction 30 tablet 3     No current facility-administered medications for this visit.       Past Medical History:   Diagnosis Date    Environmental and seasonal allergies 3/29/2016    Essential hypertension with goal blood pressure less than 140/90 9/30/2016    Inguinal hernia 3/16/2023    Added automatically from request for surgery [id number]    Prostate cancer (HCC)        Past Surgical History:   Procedure Laterality Date    COLONOSCOPY N/A 1/27/2017    COLONOSCOPY/  performed by Mesfin Encarnacion MD at [facility] ENDOSCOPY    COLONOSCOPY N/A 1/26/2023    COLORECTAL CANCER SCREENING, NOT HIGH RISK performed by Mitch Harrell MD at [facility] [place]COPY    COLONOSCOPY FLX DX W/COLLJ SPEC WHEN PFRMD  1/27/2017         CYST REMOVAL Right     wrist    HERNIA REPAIR Right 5/15/2023    RIGHT HERNIA INGUINAL REPAIR performed by Mesfin Encarnacion MD at [facility] [place]    PROSTATECTOMY  02/04/2021    SHOULDER ARTHROSCOPY Left             Social History

## 2024-08-30 RX ORDER — SILDENAFIL 100 MG/1
100 TABLET, FILM COATED ORAL DAILY PRN
Qty: 30 TABLET | Refills: 3 | Status: SHIPPED | OUTPATIENT
Start: 2024-08-30

## 2025-02-10 ENCOUNTER — OFFICE VISIT (OUTPATIENT)
Dept: FAMILY MEDICINE CLINIC | Facility: CLINIC | Age: 60
End: 2025-02-10

## 2025-02-10 VITALS
SYSTOLIC BLOOD PRESSURE: 130 MMHG | DIASTOLIC BLOOD PRESSURE: 80 MMHG | TEMPERATURE: 98.2 F | OXYGEN SATURATION: 98 % | HEART RATE: 75 BPM | WEIGHT: 174 LBS | BODY MASS INDEX: 24.97 KG/M2

## 2025-02-10 DIAGNOSIS — J30.1 ALLERGIC RHINITIS DUE TO POLLEN, UNSPECIFIED SEASONALITY: ICD-10-CM

## 2025-02-10 DIAGNOSIS — Z85.46 HISTORY OF PROSTATE CANCER: ICD-10-CM

## 2025-02-10 DIAGNOSIS — I10 ESSENTIAL HYPERTENSION WITH GOAL BLOOD PRESSURE LESS THAN 140/90: Primary | ICD-10-CM

## 2025-02-10 DIAGNOSIS — M62.89 HAMSTRING TIGHTNESS: ICD-10-CM

## 2025-02-10 DIAGNOSIS — J30.89 ENVIRONMENTAL AND SEASONAL ALLERGIES: ICD-10-CM

## 2025-02-10 DIAGNOSIS — J00 ACUTE RHINITIS: ICD-10-CM

## 2025-02-10 DIAGNOSIS — I10 ESSENTIAL HYPERTENSION WITH GOAL BLOOD PRESSURE LESS THAN 140/90: ICD-10-CM

## 2025-02-10 DIAGNOSIS — R06.00 DYSPNEA, UNSPECIFIED TYPE: ICD-10-CM

## 2025-02-10 PROBLEM — C61 PROSTATE CANCER (HCC): Status: RESOLVED | Noted: 2021-02-08 | Resolved: 2025-02-10

## 2025-02-10 LAB
ALBUMIN SERPL-MCNC: 4 G/DL (ref 3.5–5)
ALBUMIN/GLOB SERPL: 1.2 (ref 1–1.9)
ALP SERPL-CCNC: 58 U/L (ref 40–129)
ALT SERPL-CCNC: 47 U/L (ref 8–55)
ANION GAP SERPL CALC-SCNC: 11 MMOL/L (ref 7–16)
AST SERPL-CCNC: 36 U/L (ref 15–37)
BASOPHILS # BLD: 0.06 K/UL (ref 0–0.2)
BASOPHILS NFR BLD: 1.2 % (ref 0–2)
BILIRUB SERPL-MCNC: 0.6 MG/DL (ref 0–1.2)
BUN SERPL-MCNC: 16 MG/DL (ref 6–23)
CALCIUM SERPL-MCNC: 9.3 MG/DL (ref 8.8–10.2)
CHLORIDE SERPL-SCNC: 105 MMOL/L (ref 98–107)
CHOLEST SERPL-MCNC: 152 MG/DL (ref 0–200)
CO2 SERPL-SCNC: 28 MMOL/L (ref 20–29)
CREAT SERPL-MCNC: 1.1 MG/DL (ref 0.8–1.3)
DIFFERENTIAL METHOD BLD: NORMAL
EOSINOPHIL # BLD: 0.12 K/UL (ref 0–0.8)
EOSINOPHIL NFR BLD: 2.4 % (ref 0.5–7.8)
ERYTHROCYTE [DISTWIDTH] IN BLOOD BY AUTOMATED COUNT: 13.2 % (ref 11.9–14.6)
GLOBULIN SER CALC-MCNC: 3.2 G/DL (ref 2.3–3.5)
GLUCOSE SERPL-MCNC: 102 MG/DL (ref 70–99)
HCT VFR BLD AUTO: 41.9 % (ref 41.1–50.3)
HDLC SERPL-MCNC: 54 MG/DL (ref 40–60)
HDLC SERPL: 2.8 (ref 0–5)
HGB BLD-MCNC: 14.3 G/DL (ref 13.6–17.2)
IMM GRANULOCYTES # BLD AUTO: 0.02 K/UL (ref 0–0.5)
IMM GRANULOCYTES NFR BLD AUTO: 0.4 % (ref 0–5)
LDLC SERPL CALC-MCNC: 88 MG/DL (ref 0–100)
LYMPHOCYTES # BLD: 1.43 K/UL (ref 0.5–4.6)
LYMPHOCYTES NFR BLD: 28.3 % (ref 13–44)
MCH RBC QN AUTO: 32.3 PG (ref 26.1–32.9)
MCHC RBC AUTO-ENTMCNC: 34.1 G/DL (ref 31.4–35)
MCV RBC AUTO: 94.6 FL (ref 82–102)
MONOCYTES # BLD: 0.5 K/UL (ref 0.1–1.3)
MONOCYTES NFR BLD: 9.9 % (ref 4–12)
NEUTS SEG # BLD: 2.93 K/UL (ref 1.7–8.2)
NEUTS SEG NFR BLD: 57.8 % (ref 43–78)
NRBC # BLD: 0 K/UL (ref 0–0.2)
PLATELET # BLD AUTO: 187 K/UL (ref 150–450)
PMV BLD AUTO: 11.8 FL (ref 9.4–12.3)
POTASSIUM SERPL-SCNC: 4 MMOL/L (ref 3.5–5.1)
PROT SERPL-MCNC: 7.3 G/DL (ref 6.3–8.2)
RBC # BLD AUTO: 4.43 M/UL (ref 4.23–5.6)
SODIUM SERPL-SCNC: 143 MMOL/L (ref 136–145)
TRIGL SERPL-MCNC: 49 MG/DL (ref 0–150)
VLDLC SERPL CALC-MCNC: 10 MG/DL (ref 6–23)
WBC # BLD AUTO: 5.1 K/UL (ref 4.3–11.1)

## 2025-02-10 PROCEDURE — 99214 OFFICE O/P EST MOD 30 MIN: CPT | Performed by: NURSE PRACTITIONER

## 2025-02-10 PROCEDURE — 3075F SYST BP GE 130 - 139MM HG: CPT | Performed by: NURSE PRACTITIONER

## 2025-02-10 PROCEDURE — 3079F DIAST BP 80-89 MM HG: CPT | Performed by: NURSE PRACTITIONER

## 2025-02-10 RX ORDER — AMLODIPINE BESYLATE 5 MG/1
5 TABLET ORAL DAILY
Qty: 90 TABLET | Refills: 3 | Status: SHIPPED | OUTPATIENT
Start: 2025-02-10

## 2025-02-10 RX ORDER — FLUTICASONE PROPIONATE 50 MCG
2 SPRAY, SUSPENSION (ML) NASAL DAILY
Qty: 16 G | Refills: 11 | Status: SHIPPED | OUTPATIENT
Start: 2025-02-10

## 2025-02-10 RX ORDER — FEXOFENADINE HCL 180 MG/1
180 TABLET ORAL DAILY PRN
Qty: 90 TABLET | Refills: 1 | Status: SHIPPED | OUTPATIENT
Start: 2025-02-10

## 2025-02-10 RX ORDER — LOSARTAN POTASSIUM 50 MG/1
50 TABLET ORAL DAILY
Qty: 90 TABLET | Refills: 2 | Status: SHIPPED | OUTPATIENT
Start: 2025-02-10

## 2025-02-10 RX ORDER — MONTELUKAST SODIUM 10 MG/1
10 TABLET ORAL NIGHTLY
Qty: 90 TABLET | Refills: 3 | Status: SHIPPED | OUTPATIENT
Start: 2025-02-10

## 2025-02-10 RX ORDER — ALBUTEROL SULFATE 90 UG/1
2 INHALANT RESPIRATORY (INHALATION) EVERY 6 HOURS PRN
Qty: 1 EACH | Refills: 2 | Status: SHIPPED | OUTPATIENT
Start: 2025-02-10

## 2025-02-10 SDOH — ECONOMIC STABILITY: FOOD INSECURITY: WITHIN THE PAST 12 MONTHS, THE FOOD YOU BOUGHT JUST DIDN'T LAST AND YOU DIDN'T HAVE MONEY TO GET MORE.: PATIENT DECLINED

## 2025-02-10 SDOH — ECONOMIC STABILITY: INCOME INSECURITY: IN THE LAST 12 MONTHS, WAS THERE A TIME WHEN YOU WERE NOT ABLE TO PAY THE MORTGAGE OR RENT ON TIME?: NO

## 2025-02-10 SDOH — ECONOMIC STABILITY: FOOD INSECURITY: WITHIN THE PAST 12 MONTHS, YOU WORRIED THAT YOUR FOOD WOULD RUN OUT BEFORE YOU GOT MONEY TO BUY MORE.: PATIENT DECLINED

## 2025-02-10 ASSESSMENT — PATIENT HEALTH QUESTIONNAIRE - PHQ9
SUM OF ALL RESPONSES TO PHQ QUESTIONS 1-9: 0
SUM OF ALL RESPONSES TO PHQ9 QUESTIONS 1 & 2: 0
SUM OF ALL RESPONSES TO PHQ QUESTIONS 1-9: 0
SUM OF ALL RESPONSES TO PHQ QUESTIONS 1-9: 0
1. LITTLE INTEREST OR PLEASURE IN DOING THINGS: NOT AT ALL
2. FEELING DOWN, DEPRESSED OR HOPELESS: NOT AT ALL
2. FEELING DOWN, DEPRESSED OR HOPELESS: NOT AT ALL
SUM OF ALL RESPONSES TO PHQ QUESTIONS 1-9: 0
SUM OF ALL RESPONSES TO PHQ9 QUESTIONS 1 & 2: 0
1. LITTLE INTEREST OR PLEASURE IN DOING THINGS: NOT AT ALL

## 2025-02-10 ASSESSMENT — ENCOUNTER SYMPTOMS
RHINORRHEA: 1
SINUS PRESSURE: 0
RESPIRATORY NEGATIVE: 1

## 2025-02-10 NOTE — ASSESSMENT & PLAN NOTE
Acute condition, new, Educational material distributed and questions answered.  Trial OTC topical analgesic, ice, knee support, foam roller massage, stretching, and sitting knee/quad stretching/strengthening, and f/u if no improvement for PT referral.  HEP exercises provided.

## 2025-02-10 NOTE — ASSESSMENT & PLAN NOTE
Chronic, at goal (stable), changes made today: add Allegra.  Flonase start as well as Allegra add on Singulair if no improvement.

## 2025-02-10 NOTE — PROGRESS NOTES
Jason Ortiz is a 59 y.o. male who presents today for the following:  Chief Complaint   Patient presents with    Follow-up     Pt presents today for FU. Pt has rt leg tightness in the back of his leg x 1 wk.        No Known Allergies    Current Outpatient Medications   Medication Sig Dispense Refill    amLODIPine (NORVASC) 5 MG tablet Take 1 tablet by mouth daily 90 tablet 3    montelukast (SINGULAIR) 10 MG tablet Take 1 tablet by mouth nightly 90 tablet 3    losartan (COZAAR) 50 MG tablet Take 1 tablet by mouth daily TAKE 1 TABLET BY MOUTH EVERY DAY Indications: high blood pressure 90 tablet 2    fluticasone (FLONASE) 50 MCG/ACT nasal spray 2 sprays by Each Nostril route daily 16 g 11    albuterol sulfate HFA (VENTOLIN HFA) 108 (90 Base) MCG/ACT inhaler Inhale 2 puffs into the lungs every 6 hours as needed for Wheezing 1 each 2    fexofenadine (ALLEGRA) 180 MG tablet Take 1 tablet by mouth daily as needed (allergies) 90 tablet 1    sildenafil (VIAGRA) 100 MG tablet Take 1 tablet by mouth daily as needed for Erectile Dysfunction (Patient not taking: Reported on 2/10/2025) 30 tablet 3    tadalafil (CIALIS) 20 MG tablet Take 1 tablet by mouth every 72 hours as needed for Erectile Dysfunction (Patient not taking: Reported on 2/10/2025) 30 tablet 1     No current facility-administered medications for this visit.       Past Medical History:   Diagnosis Date    Environmental and seasonal allergies 3/29/2016    Essential hypertension with goal blood pressure less than 140/90 9/30/2016    Inguinal hernia 3/16/2023    Added automatically from request for surgery [id number]    Prostate cancer (HCC)        Past Surgical History:   Procedure Laterality Date    COLONOSCOPY N/A 1/27/2017    COLONOSCOPY/  performed by Mesfin Encarnacion MD at [facility] ENDOSCOPY    COLONOSCOPY N/A 1/26/2023    COLORECTAL CANCER SCREENING, NOT HIGH RISK [place] [facility] [place]    COLONOSCOPY FLX DX W/COLLJ SPEC WHEN PFRMD

## 2025-02-11 LAB — PSA SERPL DL<=0.01 NG/ML-MCNC: <0.006 NG/ML (ref 0–4)

## 2025-08-01 RX ORDER — SILDENAFIL 100 MG/1
100 TABLET, FILM COATED ORAL DAILY PRN
Qty: 30 TABLET | Refills: 3 | Status: SHIPPED | OUTPATIENT
Start: 2025-08-01

## (undated) DEVICE — PAD,NON-ADHERENT,3X8,STERILE,LF,1/PK: Brand: MEDLINE

## (undated) DEVICE — KENDALL RADIOLUCENT FOAM MONITORING ELECTRODE RECTANGULAR SHAPE: Brand: KENDALL

## (undated) DEVICE — TUBING, SUCTION, 3/16" X 10', STRAIGHT: Brand: MEDLINE

## (undated) DEVICE — TRAY PREP DRY W/ PREM GLV 2 APPL 6 SPNG 2 UNDPD 1 OVERWRAP

## (undated) DEVICE — SPONGE: SPECIALTY PEANUT XR 100/CS: Brand: MEDICAL ACTION INDUSTRIES

## (undated) DEVICE — 2000CC GUARDIAN II: Brand: GUARDIAN

## (undated) DEVICE — NEEDLE HYPO 21GA L1.5IN INTRAMUSCULAR S STL LATCH BVL UP

## (undated) DEVICE — SUTURE PROL SZ 2-0 L30IN NONABSORBABLE BLU L26MM CT-2 1/2 8411H

## (undated) DEVICE — TROCAR LAP L100MM DIA5MM BLDELSS W/ STBL SL ENDOPATH XCEL

## (undated) DEVICE — BLADELESS OBTURATOR: Brand: WECK VISTA

## (undated) DEVICE — SCISSORS SURG DIA8MM MPLR CRV ENDOWRIST

## (undated) DEVICE — Device

## (undated) DEVICE — SUTURE VCRL SZ 3-0 L27IN ABSRB UD L17MM RB-1 1/2 CIR J215H

## (undated) DEVICE — CATH URETH FOL 2W SH 20FRX5 --

## (undated) DEVICE — DRAPE,UNDERBUTTOCKS,PCH,STERILE: Brand: MEDLINE

## (undated) DEVICE — GLOVE SURG SZ 75 CRM LTX FREE POLYISOPRENE POLYMER BEAD ANTI

## (undated) DEVICE — SUTURE MCRYL SZ 3-0 L27IN ABSRB UD L17MM RB-1 1/2 CIR Y215H

## (undated) DEVICE — CANNULA NSL ORAL AD FOR CAPNOFLEX CO2 O2 AIRLFE

## (undated) DEVICE — SYR 10ML LUER LOK 1/5ML GRAD --

## (undated) DEVICE — SHEET, T, LAPAROTOMY, STERILE: Brand: MEDLINE

## (undated) DEVICE — MARYLAND BIPOLAR FORCEPS: Brand: ENDOWRIST

## (undated) DEVICE — COLUMN DRAPE

## (undated) DEVICE — COVER,TABLE,44X76,STERILE: Brand: MEDLINE

## (undated) DEVICE — LARGE NEEDLE DRIVER: Brand: ENDOWRIST

## (undated) DEVICE — TROCAR: Brand: KII FIOS FIRST ENTRY

## (undated) DEVICE — DRAPE TWL SURG 16X26IN BLU ORB04] ALLCARE INC]

## (undated) DEVICE — CONTAINER SPEC FRMLN 120ML --

## (undated) DEVICE — ABSORBABLE LIGATING CLIP CARTRIDGE: Brand: LAPRO-CLIP

## (undated) DEVICE — PROGRASP FORCEPS: Brand: ENDOWRIST

## (undated) DEVICE — LUBE JELLY FOIL PACK 1.4 OZ: Brand: MEDLINE INDUSTRIES, INC.

## (undated) DEVICE — MASTISOL ADHESIVE LIQ 2/3ML

## (undated) DEVICE — VISUALIZATION SYSTEM: Brand: CLEARIFY

## (undated) DEVICE — BIPOLAR CAUTERY CORD

## (undated) DEVICE — CARDINAL HEALTH FLEXIBLE LIGHT HANDLE COVER: Brand: CARDINAL HEALTH

## (undated) DEVICE — DISK EXT FIX TENS

## (undated) DEVICE — SEAL UNIV 5-8MM DISP BX/10 -- DA VINCI XI - SNGL USE

## (undated) DEVICE — SYRINGE MED 3ML CLR PLAS STD N CTRL LUERLOCK TIP DISP

## (undated) DEVICE — MINOR SPLIT GENERAL: Brand: MEDLINE INDUSTRIES, INC.

## (undated) DEVICE — BAG,DRAINAGE,4L,A/R TOWER,LL,SLIDE TAP: Brand: MEDLINE

## (undated) DEVICE — GARMENT,MEDLINE,DVT,INT,CALF,MED, GEN2: Brand: MEDLINE

## (undated) DEVICE — 2, DISPOSABLE SUCTION/IRRIGATOR WITHOUT DISPOSABLE TIP: Brand: STRYKEFLOW

## (undated) DEVICE — CONNECTOR TBNG OD5-7MM O2 END DISP

## (undated) DEVICE — GOWN,REINF,POLY,ECL,PP SLV,XL: Brand: MEDLINE

## (undated) DEVICE — YANKAUER,BULB TIP,W/O VENT,RIGID,STERILE: Brand: MEDLINE

## (undated) DEVICE — SPONGE LAP 18X18IN STRL -- 5/PK

## (undated) DEVICE — BAG SPEC REM 224ML W4XL6IN DIA10MM 1 HND GYN DISP ENDOPCH

## (undated) DEVICE — SUTURE MCRYL SZ 3-0 L27IN ABSRB VLT L17MM RB-1 1/2 CIR Y305H

## (undated) DEVICE — 1071 S-DRP URO STLE-GAMA 10/BX,4X/C: Brand: STERI-DRAPE™

## (undated) DEVICE — DEVICE SECUREMENT AD W/ TRICOT ANCHR PD FOR F LTX SIL CATH

## (undated) DEVICE — SUTURE MCRYL SZ 4-0 L27IN ABSRB UD L19MM PS-2 1/2 CIR PRIM Y426H

## (undated) DEVICE — SUREFIT, DUAL DISPERSIVE ELECTRODE, CONTACT QUALITY MONITOR: Brand: SUREFIT

## (undated) DEVICE — APPLICATOR COT TIP CTR 6IN

## (undated) DEVICE — DRAIN SURG PENROSE 0.25X12 IN CLOSED WND DRAINAGE PREM SIL

## (undated) DEVICE — ELECTRO LUBE IS A SINGLE PATIENT USE DEVICE THAT IS INTENDED TO BE USED ON ELECTROSURGICAL ELECTRODES TO REDUCE STICKING.: Brand: KEY SURGICAL ELECTRO LUBE

## (undated) DEVICE — SOLUTION IRRIG 1000ML H2O STRL BLT

## (undated) DEVICE — JELLY LUBRICATING 10GM PREFIL SYR LUBE

## (undated) DEVICE — DERMABOND SKIN ADH 0.7ML -- DERMABOND ADVANCED 12/BX

## (undated) DEVICE — [HIGH FLOW INSUFFLATOR,  DO NOT USE IF PACKAGE IS DAMAGED,  KEEP DRY,  KEEP AWAY FROM SUNLIGHT,  PROTECT FROM HEAT AND RADIOACTIVE SOURCES.]: Brand: PNEUMOSURE

## (undated) DEVICE — LOGICUT SCISSOR LENGTH 450MM: Brand: LOGI - LAPAROSCOPIC INSTRUMENT SYSTEM

## (undated) DEVICE — COVER MPLR TIP CRV SCIS ACC DA VINCI

## (undated) DEVICE — BASIC SINGLE BASIN-LF: Brand: MEDLINE INDUSTRIES, INC.

## (undated) DEVICE — LEGGINGS, PAIR, 31X48, STERILE: Brand: MEDLINE

## (undated) DEVICE — SUTURE ABSORBABLE BRAIDED 2-0 CT-1 27 IN UD VICRYL J259H

## (undated) DEVICE — DRAPE,TOP,102X53,STERILE: Brand: MEDLINE

## (undated) DEVICE — GAUZE,SPONGE,4"X4",12PLY,WOVEN,NS,LF: Brand: MEDLINE

## (undated) DEVICE — SUTURE SZ 0 27IN 5/8 CIR UR-6  TAPER PT VIOLET ABSRB VICRYL J603H

## (undated) DEVICE — MONOPOLAR CAUTERY CORD

## (undated) DEVICE — SUTURE VCRL SZ 2-0 L27IN ABSRB UD L26MM CT-2 1/2 CIR J269H

## (undated) DEVICE — STRIP,CLOSURE,WOUND,MEDI-STRIP,1/2X4: Brand: MEDLINE

## (undated) DEVICE — BUTTON SWITCH PENCIL BLADE ELECTRODE, HOLSTER: Brand: EDGE

## (undated) DEVICE — SINGLE PORT MANIFOLD: Brand: NEPTUNE 2

## (undated) DEVICE — AIRLIFE™ OXYGEN TUBING 7 FEET (2.1 M) CRUSH RESISTANT OXYGEN TUBING, VINYL TIPPED: Brand: AIRLIFE™

## (undated) DEVICE — AMD ANTIMICROBIAL GAUZE SPONGES,12 PLY USP TYPE VII, 0.2% POLYHEXAMETHYLENE BIGUANIDE HCI (PHMB): Brand: CURITY

## (undated) DEVICE — NEEDLE SYR 18GA L1.5IN RED PLAS HUB S STL BLNT FILL W/O

## (undated) DEVICE — PREP SKN CHLRAPRP APL 26ML STR --

## (undated) DEVICE — DRAIN JACKSON PRATT ROUND 15FR: Brand: CARDINAL HEALTH

## (undated) DEVICE — SYRINGE MED 10ML LUERLOCK TIP W/O SFTY DISP

## (undated) DEVICE — CATHETER F BLLN 5CC 18FR 2 W HYDRGEL COAT LESS TRAUM LUB

## (undated) DEVICE — 3M™ TEGADERM™ TRANSPARENT FILM DRESSING FRAME STYLE, 1626W, 4 IN X 4-3/4 IN (10 CM X 12 CM), 50/CT 4CT/CASE: Brand: 3M™ TEGADERM™

## (undated) DEVICE — SUTURE VCRL SZ 4-0 L18IN ABSRB UD L19MM PS-2 3/8 CIR PRIM J496H

## (undated) DEVICE — BLADE ASSEMB CLP HAIR FINE --

## (undated) DEVICE — 40585 XL ADVANCED TRENDELENBURG POSITIONING KIT: Brand: 40585 XL ADVANCED TRENDELENBURG POSITIONING KIT

## (undated) DEVICE — ROBOTIC PROSTATE: Brand: MEDLINE INDUSTRIES, INC.

## (undated) DEVICE — SOLUTION IRRIG 1000ML 0.9% SOD CHL USP POUR PLAS BTL

## (undated) DEVICE — REM POLYHESIVE ADULT PATIENT RETURN ELECTRODE: Brand: VALLEYLAB

## (undated) DEVICE — SUTURE ETHLN SZ 2-0 L18IN NONABSORBABLE BLK L26MM PS 3/8 585H

## (undated) DEVICE — SYRINGE CATH TIP 50ML

## (undated) DEVICE — NEEDLE INSUF L120MM ULT VERES ENDOPATH

## (undated) DEVICE — 3M™ TEGADERM™ TRANSPARENT FILM DRESSING FRAME STYLE, 1624W, 2-3/8 IN X 2-3/4 IN (6 CM X 7 CM), 100/CT 4CT/CASE: Brand: 3M™ TEGADERM™

## (undated) DEVICE — SYRINGE, LUER SLIP, STERILE, 60ML: Brand: MEDLINE

## (undated) DEVICE — ARM DRAPE

## (undated) DEVICE — SUTURE VCRL SZ 3-0 L27IN ABSRB UD L26MM CT-2 1/2 CIR J232H